# Patient Record
Sex: FEMALE | Race: WHITE | NOT HISPANIC OR LATINO | Employment: UNEMPLOYED | URBAN - METROPOLITAN AREA
[De-identification: names, ages, dates, MRNs, and addresses within clinical notes are randomized per-mention and may not be internally consistent; named-entity substitution may affect disease eponyms.]

---
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
## 2023-01-06 ENCOUNTER — SOCIAL WORK (OUTPATIENT)
Dept: BEHAVIORAL/MENTAL HEALTH CLINIC | Facility: CLINIC | Age: 18
End: 2023-01-06

## 2023-01-06 DIAGNOSIS — F32.0 CURRENT MILD EPISODE OF MAJOR DEPRESSIVE DISORDER, UNSPECIFIED WHETHER RECURRENT (HCC): Primary | ICD-10-CM

## 2023-01-06 DIAGNOSIS — F41.1 GENERALIZED ANXIETY DISORDER: ICD-10-CM
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
## 2023-03-29 ENCOUNTER — TELEPHONE (OUTPATIENT)
Dept: PSYCHIATRY | Facility: CLINIC | Age: 18
End: 2023-03-29

## 2023-03-29 DIAGNOSIS — F32.81 PMDD (PREMENSTRUAL DYSPHORIC DISORDER): ICD-10-CM

## 2023-03-30 RX ORDER — ESCITALOPRAM OXALATE 5 MG/1
5 TABLET ORAL EVERY MORNING
Qty: 90 TABLET | Refills: 0 | Status: SHIPPED | OUTPATIENT
Start: 2023-03-30

## 2023-05-05 ENCOUNTER — OFFICE VISIT (OUTPATIENT)
Dept: FAMILY MEDICINE CLINIC | Facility: CLINIC | Age: 18
End: 2023-05-05

## 2023-05-05 VITALS
DIASTOLIC BLOOD PRESSURE: 78 MMHG | OXYGEN SATURATION: 97 % | WEIGHT: 182 LBS | TEMPERATURE: 98.2 F | HEART RATE: 83 BPM | HEIGHT: 67 IN | BODY MASS INDEX: 28.56 KG/M2 | SYSTOLIC BLOOD PRESSURE: 120 MMHG

## 2023-05-05 DIAGNOSIS — H01.134 ECZEMATOUS DERMATITIS OF UPPER AND LOWER EYELIDS OF BOTH EYES: ICD-10-CM

## 2023-05-05 DIAGNOSIS — E55.9 VITAMIN D INSUFFICIENCY: ICD-10-CM

## 2023-05-05 DIAGNOSIS — Z13.220 LIPID SCREENING: ICD-10-CM

## 2023-05-05 DIAGNOSIS — L21.9 SEBORRHEIC DERMATITIS: ICD-10-CM

## 2023-05-05 DIAGNOSIS — Z13.1 DIABETES MELLITUS SCREENING: ICD-10-CM

## 2023-05-05 DIAGNOSIS — G43.909 MIGRAINE WITHOUT STATUS MIGRAINOSUS, NOT INTRACTABLE, UNSPECIFIED MIGRAINE TYPE: ICD-10-CM

## 2023-05-05 DIAGNOSIS — L71.0 PERIORAL DERMATITIS: ICD-10-CM

## 2023-05-05 DIAGNOSIS — M54.50 ACUTE LOW BACK PAIN WITHOUT SCIATICA, UNSPECIFIED BACK PAIN LATERALITY: ICD-10-CM

## 2023-05-05 DIAGNOSIS — Z11.59 NEED FOR HEPATITIS C SCREENING TEST: ICD-10-CM

## 2023-05-05 DIAGNOSIS — Z00.00 ANNUAL PHYSICAL EXAM: Primary | ICD-10-CM

## 2023-05-05 DIAGNOSIS — Z11.3 SCREENING FOR STD (SEXUALLY TRANSMITTED DISEASE): ICD-10-CM

## 2023-05-05 DIAGNOSIS — H01.131 ECZEMATOUS DERMATITIS OF UPPER AND LOWER EYELIDS OF BOTH EYES: ICD-10-CM

## 2023-05-05 DIAGNOSIS — Z11.4 SCREENING FOR HIV (HUMAN IMMUNODEFICIENCY VIRUS): ICD-10-CM

## 2023-05-05 DIAGNOSIS — F32.81 PMDD (PREMENSTRUAL DYSPHORIC DISORDER): ICD-10-CM

## 2023-05-05 DIAGNOSIS — H01.132 ECZEMATOUS DERMATITIS OF UPPER AND LOWER EYELIDS OF BOTH EYES: ICD-10-CM

## 2023-05-05 DIAGNOSIS — H01.135 ECZEMATOUS DERMATITIS OF UPPER AND LOWER EYELIDS OF BOTH EYES: ICD-10-CM

## 2023-05-05 DIAGNOSIS — E66.3 OVERWEIGHT (BMI 25.0-29.9): ICD-10-CM

## 2023-05-05 RX ORDER — ESCITALOPRAM OXALATE 10 MG/1
10 TABLET ORAL EVERY MORNING
Qty: 90 TABLET | Refills: 0 | Status: SHIPPED | OUTPATIENT
Start: 2023-05-05

## 2023-05-05 RX ORDER — FLUOCINONIDE TOPICAL SOLUTION USP, 0.05% 0.5 MG/ML
SOLUTION TOPICAL 2 TIMES DAILY
Qty: 60 ML | Refills: 0 | Status: SHIPPED | OUTPATIENT
Start: 2023-05-05

## 2023-05-05 RX ORDER — CLINDAMYCIN PHOSPHATE 10 MG/G
GEL TOPICAL 2 TIMES DAILY
Qty: 30 G | Refills: 0 | Status: SHIPPED | OUTPATIENT
Start: 2023-05-05

## 2023-05-05 NOTE — PATIENT INSTRUCTIONS
Wellness Visit for Adults   AMBULATORY CARE:   A wellness visit  is when you see your healthcare provider to get screened for health problems  Your healthcare provider will also give you advice on how to stay healthy  Write down your questions so you remember to ask them  Ask your healthcare provider how often you should have a wellness visit  What happens at a wellness visit:  Your healthcare provider will ask about your health, and your family history of health problems  This includes high blood pressure, heart disease, and cancer  He or she will ask if you have symptoms that concern you, if you smoke, and about your mood  You may also be asked about your intake of medicines, supplements, food, and alcohol  Any of the following may be done: Your weight  will be checked  Your height may also be checked so your body mass index (BMI) can be calculated  Your BMI shows if you are at a healthy weight  Your blood pressure  and heart rate will be checked  Your temperature may also be checked  Blood and urine tests  may be done  Blood tests may be done to check your cholesterol levels  Abnormal cholesterol levels increase your risk for heart disease and stroke  You may also need a blood or urine test to check for diabetes if you are at increased risk  Urine tests may be done to look for signs of an infection or kidney disease  A physical exam  includes checking your heartbeat and lungs with a stethoscope  Your healthcare provider may also check your skin to look for sun damage  Screening tests  may be recommended  A screening test is done to check for diseases that may not cause symptoms  The screening tests you may need depend on your age, gender, family history, and lifestyle habits  For example, colorectal screening may be recommended if you are 48years old or older  Screening tests you need if you are a woman:   A Pap smear  is used to screen for cervical cancer   Pap smears are usually done every 3 to 5 years depending on your age  You may need them more often if you have had abnormal Pap smear test results in the past  Ask your healthcare provider how often you should have a Pap smear  A mammogram  is an x-ray of your breasts to screen for breast cancer  Experts recommend mammograms every 2 years starting at age 48 years  You may need a mammogram at age 52 years or younger if you have an increased risk for breast cancer  Talk to your healthcare provider about when you should start having mammograms and how often you need them  Vaccines you may need:   Get an influenza vaccine  every year  The influenza vaccine protects you from the flu  Several types of viruses cause the flu  The viruses change over time, so new vaccines are made each year  Get a tetanus-diphtheria (Td) booster vaccine  every 10 years  This vaccine protects you against tetanus and diphtheria  Tetanus is a severe infection that may cause painful muscle spasms and lockjaw  Diphtheria is a severe bacterial infection that causes a thick covering in the back of your mouth and throat  Get a human papillomavirus (HPV) vaccine  if you are female and aged 23 to 32 or male 23 to 24 and never received it  This vaccine protects you from HPV infection  HPV is the most common infection spread by sexual contact  HPV may also cause vaginal, penile, and anal cancers  Get a pneumococcal vaccine  if you are aged 72 years or older  The pneumococcal vaccine is an injection given to protect you from pneumococcal disease  Pneumococcal disease is an infection caused by pneumococcal bacteria  The infection may cause pneumonia, meningitis, or an ear infection  Get a shingles vaccine  if you are 60 or older, even if you have had shingles before  The shingles vaccine is an injection to protect you from the varicella-zoster virus  This is the same virus that causes chickenpox   Shingles is a painful rash that develops in people who had chickenpox or have been exposed to the virus  How to eat healthy:  My Plate is a model for planning healthy meals  It shows the types and amounts of foods that should go on your plate  Fruits and vegetables make up about half of your plate, and grains and protein make up the other half  A serving of dairy is included on the side of your plate  The amount of calories and serving sizes you need depends on your age, gender, weight, and height  Examples of healthy foods are listed below:  Eat a variety of vegetables  such as dark green, red, and orange vegetables  You can also include canned vegetables low in sodium (salt) and frozen vegetables without added butter or sauces  Eat a variety of fresh fruits , canned fruit in 100% juice, frozen fruit, and dried fruit  Include whole grains  At least half of the grains you eat should be whole grains  Examples include whole-wheat bread, wheat pasta, brown rice, and whole-grain cereals such as oatmeal     Eat a variety of protein foods such as seafood (fish and shellfish), lean meat, and poultry without skin (turkey and chicken)  Examples of lean meats include pork leg, shoulder, or tenderloin, and beef round, sirloin, tenderloin, and extra lean ground beef  Other protein foods include eggs and egg substitutes, beans, peas, soy products, nuts, and seeds  Choose low-fat dairy products such as skim or 1% milk or low-fat yogurt, cheese, and cottage cheese  Limit unhealthy fats  such as butter, hard margarine, and shortening  Exercise:  Exercise at least 30 minutes per day on most days of the week  Some examples of exercise include walking, biking, dancing, and swimming  You can also fit in more physical activity by taking the stairs instead of the elevator or parking farther away from stores  Include muscle strengthening activities 2 days each week  Regular exercise provides many health benefits   It helps you manage your weight, and decreases your risk for type 2 diabetes, heart disease, stroke, and high blood pressure  Exercise can also help improve your mood  Ask your healthcare provider about the best exercise plan for you  General health and safety guidelines:   Do not smoke  Nicotine and other chemicals in cigarettes and cigars can cause lung damage  Ask your healthcare provider for information if you currently smoke and need help to quit  E-cigarettes or smokeless tobacco still contain nicotine  Talk to your healthcare provider before you use these products  Limit alcohol  A drink of alcohol is 12 ounces of beer, 5 ounces of wine, or 1½ ounces of liquor  Lose weight, if needed  Being overweight increases your risk of certain health conditions  These include heart disease, high blood pressure, type 2 diabetes, and certain types of cancer  Protect your skin  Do not sunbathe or use tanning beds  Use sunscreen with a SPF 15 or higher  Apply sunscreen at least 15 minutes before you go outside  Reapply sunscreen every 2 hours  Wear protective clothing, hats, and sunglasses when you are outside  Drive safely  Always wear your seatbelt  Make sure everyone in your car wears a seatbelt  A seatbelt can save your life if you are in an accident  Do not use your cell phone when you are driving  This could distract you and cause an accident  Pull over if you need to make a call or send a text message  Practice safe sex  Use latex condoms if are sexually active and have more than one partner  Your healthcare provider may recommend screening tests for sexually transmitted infections (STIs)  Wear helmets, lifejackets, and protective gear  Always wear a helmet when you ride a bike or motorcycle, go skiing, or play sports that could cause a head injury  Wear protective equipment when you play sports  Wear a lifejacket when you are on a boat or doing water sports      © Copyright [employer] 2022 Information is for End User's use only and may not be sold, redistributed or otherwise used for commercial purposes  The above information is an  only  It is not intended as medical advice for individual conditions or treatments  Talk to your doctor, nurse or pharmacist before following any medical regimen to see if it is safe and effective for you  Lower Back Exercises   WHAT YOU NEED TO KNOW:   Lower back exercises help heal and strengthen your back muscles to prevent another injury  Ask your healthcare provider if you need to see a physical therapist for more advanced exercises  DISCHARGE INSTRUCTIONS:   Return to the emergency department if:   You have severe pain that prevents you from moving  Call your doctor if:   Your pain becomes worse  You have new pain  You have questions or concerns about your condition or care  Do lower back exercises safely:   Do the exercises on a mat or firm surface (not on a bed)  A firm surface will support your spine and prevent low back pain  Move slowly and smoothly  Avoid fast or jerky motions  Breathe normally  Do not hold your breath  Stop if you feel pain  It is normal to feel some discomfort at first, but you should not feel pain  Regular exercise will help decrease your discomfort over time  Lower back exercises: Your healthcare provider may recommend that you do back exercises 10 to 30 minutes each day  He or she may also recommend that you do exercises 1 to 3 times each day  Ask your provider which exercises are best for you and how often to do them  Ankle pumps:  Lie on your back  Move your foot up (with your toes pointing toward your head)  Then, move your foot down (with your toes pointing away from you)  Repeat this exercise 10 times on each side  Heel slides:  Lie on your back  Slowly bend one leg and then straighten it  Next, bend the other leg and then straighten it  Repeat 10 times on each side           Pelvic tilt:  Lie on your back with your knees bent and feet flat on the floor  Place your arms in a relaxed position beside your body  Tighten the muscles of your abdomen and flatten your back against the floor  Hold for 5 seconds  Repeat 5 times  Back stretch:  Lie on your back with your hands behind your head  Bend your knees and turn the lower half of your body to one side  Hold this position for 10 seconds  Repeat 3 times on each side  Straight leg raises:  Lie on your back with one leg straight  Bend the other knee  Tighten your abdomen and then slowly lift the straight leg up about 6 to 12 inches off the floor  Hold for 1 to 5 seconds  Lower your leg slowly  Repeat 10 times on each leg  Knee-to-chest:  Lie on your back with your knees bent and feet flat on the floor  Pull one of your knees toward your chest and hold it there for 5 seconds  Return your leg to the starting position  Lift the other knee toward your chest and hold for 5 seconds  Do this 5 times on each side  Cat and camel:  Place your hands and knees on the floor  Arch your back upward toward the ceiling and lower your head  Round out your spine as much as you can  Hold for 5 seconds  Lift your head upward and push your chest downward toward the floor  Hold for 5 seconds  Do 3 sets or as directed  Wall squats:  Stand with your back against a wall  Tighten the muscles of your abdomen  Slowly lower your body until your knees are bent at a 45 degree angle  Hold this position for 5 seconds  Slowly move back up to a standing position  Repeat 10 times  Curl up:  Lie on your back with your knees bent and feet flat on the floor  Place your hands, palms down, underneath the curve in your lower back  Next, with your elbows on the floor, lift your shoulders and chest 2 to 3 inches  Keep your head in line with your shoulders  Hold this position for 5 seconds  When you can do this exercise without pain for 10 to 15 seconds, you may add a rotation   While your shoulders and chest are lifted off the ground, turn slightly to the left and hold  Repeat on the other side  Bird dog:  Place your hands and knees on the floor  Keep your wrists directly below your shoulders and your knees directly below your hips  Pull your belly button in toward your spine  Do not flatten or arch your back  Tighten your abdominal muscles  Raise one arm straight out so that it is aligned with your head  Next, raise the leg opposite your arm  Hold this position for 15 seconds  Lower your arm and leg slowly and change sides  Do 5 sets  Follow up with your doctor as directed:  Write down your questions so you remember to ask them during your visits  © Copyright [employer] 2022 Information is for End User's use only and may not be sold, redistributed or otherwise used for commercial purposes  The above information is an  only  It is not intended as medical advice for individual conditions or treatments  Talk to your doctor, nurse or pharmacist before following any medical regimen to see if it is safe and effective for you

## 2023-05-05 NOTE — PROGRESS NOTES
ADULT ANNUAL [facility]    NAME: Ricky Colvin  AGE: 21 y o  SEX: female  : 2005     DATE: 2023     Assessment and Plan:     Problem List Items Addressed This Visit        Digestive    Perioral dermatitis     Given clindamycin gel to try as needed  Call if worsens or persists  Relevant Medications    hydrocortisone 2 5 % cream    fluocinonide (LIDEX) 0 05 % external solution    clindamycin (CLINDAGEL) 1 % gel       Cardiovascular and Mediastinum    Migraine     Overall well controlled  Continue Imitrex, Zofran, and Excedrin as needed  Relevant Medications    escitalopram (LEXAPRO) 10 mg tablet       Musculoskeletal and Integument    Eczematous dermatitis of upper and lower eyelids of both eyes     Use hydrocortisone cream twice daily as needed  Encouraged to use for a short period of time (up to 2 weeks) due to this and skin of eyelids  Call if persists  Relevant Medications    hydrocortisone 2 5 % cream    fluocinonide (LIDEX) 0 05 % external solution    clindamycin (CLINDAGEL) 1 % gel    Seborrheic dermatitis     Given fluocinonide solution to try as needed  Relevant Medications    hydrocortisone 2 5 % cream    fluocinonide (LIDEX) 0 05 % external solution    clindamycin (CLINDAGEL) 1 % gel       Other    PMDD (premenstrual dysphoric disorder)     Patient has been noticing difficulty 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 and increasing her Lexapro on those days  Patient was encouraged to try to keep her dose consistent  We will increase to 10 mg daily and recheck in 6 weeks  Call with concerns in the interim  Relevant Medications    escitalopram (LEXAPRO) 10 mg tablet    Vitamin D insufficiency     Not currently supplementing  Recheck with labs as ordered  Relevant Orders    Vitamin D 25 hydroxy    Overweight (BMI 25 0-29  9)    Relevant Orders CBC and differential    Comprehensive metabolic panel    Lipid Panel with Direct LDL reflex    TSH, 3rd generation with Free T4 reflex   Other Visit Diagnoses     Annual physical exam    -  Primary    Need for hepatitis C screening test        Relevant Orders    Hepatitis C Antibody    Screening for HIV (human immunodeficiency virus)        Relevant Orders    HIV 1/2 AG/AB w Reflex [facility] for 2 yr old and above    Screening for STD (sexually transmitted disease)        Relevant Orders    Chlamydia/GC amplified DNA by PCR    Hepatitis B surface antigen    RPR, Rfx Qn RPR/Confirm TP    Diabetes mellitus screening        Relevant Orders    Comprehensive metabolic panel    Lipid screening        Relevant Orders    Lipid Panel with Direct LDL reflex    Acute low back pain without sciatica, unspecified back pain laterality        Given low back exercises to try  Call if worsens or persists  The USPSTF recommendation for HIV screening in all patients between 13and 72years old (once in lifetime or annually with risk factors) was discussed with the patient  The patient agreed to testing  Immunizations and preventive care screenings were discussed with patient today  Appropriate education was printed on patient's after visit summary  Counseling:  · Exercise: the importance of regular exercise/physical activity was discussed  Recommend exercise 3-5 times per week for at least 30 minutes  BMI Counseling: Body mass index is 28 94 kg/m²  The BMI is above normal  Nutrition recommendations include encouraging healthy choices of fruits and vegetables  Exercise recommendations include exercising 3-5 times per week  Rationale for BMI follow-up plan is due to patient being overweight or obese  Depression Screening and Follow-up Plan: Patient was screened for depression during today's encounter  They screened negative with a PHQ-2 score of 1  Return in about 6 weeks (around 6/16/2023) for Recheck  Chief Complaint:     No chief complaint on file  History of Present Illness:     Adult Annual Physical   Patient here for a comprehensive physical exam  The patient reports problems - as below  Notes that she has been having dry skin around the eyes and mouth - notes that she has tried cooler showers, applying coconut oil to the scalp, tried Aquaphor on the face, Cerave with hyaluronic acid and Cerave PM, plus multiple dandruff products like Head and shoulders, Selsun Blue and Nizoral - notes nothing helps besides the Aquaphor temporarily - this began in the winter and is not improving - has had this before but has previously been just small patches    Notes that her back has been bothering her severely for 2+ weeks - no benefit from getting a new back - no radiation - no leg weakness or incontinence - she lifts for work but denies a known triggering event     PMDD - Lexapro 5 mg daily - notes that she has been increasing the Lexapro to 10 mg on stressful days such as working with [employer]    Migraines-Imitrex 50 mg, Zofran, Excedrin - overall controlled     Vitamin D insufficiency - not on supplement - level was 26 6 in March 2022        Diet and Physical Activity  · Diet/Nutrition: portions are improving - trying to be conscious about not binging - averages 2-3 fruits and veggies  · Exercise: walking  Depression Screening  PHQ-2/9 Depression Screening    Little interest or pleasure in doing things: 1 - several days  Feeling down, depressed, or hopeless: 0 - not at all  PHQ-2 Score: 1  PHQ-2 Interpretation: Negative depression screen       General Health  · Sleep: sleeps well and gets more than 8 hours of sleep on average  · Hearing: normal - bilateral   · Vision: no vision problems  · Dental: regular dental visits  /GYN Health  · Last menstrual period: about 2 weeks ago  · Contraceptive method: IUD placement         Review of Systems:     Review of Systems   Constitutional: Negative for chills and fever  HENT: Negative for congestion, rhinorrhea and sore throat  Eyes: Negative for visual disturbance  Respiratory: Negative for cough, shortness of breath and wheezing  Cardiovascular: Negative for chest pain, palpitations and leg swelling  Gastrointestinal: Negative for abdominal pain, blood in stool, constipation, diarrhea, nausea and vomiting  Endocrine: Negative for polydipsia and polyuria  Genitourinary: Negative for dysuria and frequency  Musculoskeletal: Positive for back pain  Negative for arthralgias and myalgias  Skin: Negative for rash  Dry skin as in HPI   Neurological: Negative for dizziness, syncope and headaches  Hematological: Does not bruise/bleed easily  Psychiatric/Behavioral: Negative for dysphoric mood  The patient is not nervous/anxious  Past Medical History:     Past Medical History:   Diagnosis Date   • Concussion with no loss of consciousness 10/8/2019   • Obstructive sleep apnea, pediatric    • Right ankle sprain 1/8/2018      Past Surgical History:     Past Surgical History:   Procedure Laterality Date   • TONSILLECTOMY AND ADENOIDECTOMY  08/2008    resolved: 8/2008   • WISDOM TOOTH EXTRACTION  08/07/2020      Social History:     Social History     Socioeconomic History   • Marital status: Single     Spouse name: None   • Number of children: None   • Years of education: None   • Highest education level: None   Occupational History   • None   Tobacco Use   • Smoking status: Never   • Smokeless tobacco: Never   Vaping Use   • Vaping Use: Never used   Substance and Sexual Activity   • Alcohol use: No   • Drug use: Never   • Sexual activity: Yes     Partners: Male     Birth control/protection: Condom Male, I U D     Other Topics Concern   • None   Social History Narrative    Caffeine use- one cup daily a few times a week, coffee or cola     Social Determinants of Health     Financial Resource Strain: Not on file   Food Insecurity: Not on file   Transportation Needs: Not on file   Physical Activity: Not on file   Stress: Not on file   Social Connections: Not on file   Intimate Partner Violence: Not on file   Housing Stability: Not on file      Family History:     Family History   Problem Relation Age of Onset   • Otosclerosis Mother    • No Known Problems Father    • Asthma Sister    • Hypertension Maternal Grandmother    • Skin cancer Maternal Grandmother    • Leukemia Maternal Grandmother         CLL   • Alcohol abuse Maternal Grandfather    • Stroke Maternal Grandfather    • Cirrhosis Maternal Grandfather         hepatic   • Anxiety disorder Paternal Grandfather    • Depression Paternal Grandfather    • Suicidality Paternal Grandfather       Current Medications:     Current Outpatient Medications   Medication Sig Dispense Refill   • Acetaminophen-Caffeine 500-65 MG TABS Take 1 tablet by mouth as needed for migraine     • clindamycin (CLINDAGEL) 1 % gel Apply topically 2 (two) times a day Apply to affected areas around the mouth  30 g 0   • escitalopram (LEXAPRO) 10 mg tablet Take 1 tablet (10 mg total) by mouth every morning 90 tablet 0   • fluocinonide (LIDEX) 0 05 % external solution Apply topically 2 (two) times a day Apply to scalp  60 mL 0   • hydrocortisone 2 5 % cream Apply topically 2 (two) times a day To the eyelids as needed  30 g 0   • Multiple Vitamin (MULTI VITAMIN DAILY PO) Take 1 tablet by mouth daily     • ondansetron (ZOFRAN) 4 mg tablet Take 1 tablet (4 mg total) by mouth every 8 (eight) hours as needed for nausea or vomiting 10 tablet 0   • SUMAtriptan (Imitrex) 50 mg tablet Take 1 tablet (50 mg total) by mouth 2 (two) times a day as needed for migraine for up to 9 doses 9 tablet 0     No current facility-administered medications for this visit        Allergies:     No Known Allergies   Physical Exam:     /78 (BP Location: Right arm, Patient Position: Sitting, Cuff Size: Standard)   Pulse 83   Temp 98 2 °F (36 8 °C) "(Tympanic)    5' 6 5\" (1 689 m)   Wt 82 6 kg (182 lb)   LMP 04/21/2023 (Approximate) Comment: IUD  SpO2 97%   BMI 28 94 kg/m²     Physical Exam  Vitals reviewed  Constitutional:       General: She is not in acute distress  Appearance: Normal appearance  She is well-developed  She is not ill-appearing  HENT:      Head: Normocephalic and atraumatic  Right Ear: Tympanic membrane, ear canal and external ear normal       Left Ear: Tympanic membrane, ear canal and external ear normal       Nose: Nose normal  No congestion  Mouth/Throat:      Mouth: Mucous membranes are moist       Pharynx: No oropharyngeal exudate  Eyes:      Conjunctiva/sclera: Conjunctivae normal       Pupils: Pupils are equal, round, and reactive to light  Neck:      Thyroid: No thyromegaly  Vascular: No carotid bruit  Cardiovascular:      Rate and Rhythm: Normal rate and regular rhythm  Pulses: Normal pulses  Heart sounds: Normal heart sounds  No murmur heard  Pulmonary:      Effort: Pulmonary effort is normal       Breath sounds: Normal breath sounds  No wheezing, rhonchi or rales  Abdominal:      General: Bowel sounds are normal  There is no distension  Palpations: Abdomen is soft  There is no mass  Tenderness: There is no abdominal tenderness  Musculoskeletal:      Cervical back: Normal range of motion and neck supple  Right lower leg: No edema  Left lower leg: No edema  Lymphadenopathy:      Cervical: No cervical adenopathy  Skin:     General: Skin is warm and dry  Findings: No rash  Neurological:      Mental Status: She is alert  Sensory: No sensory deficit  Comments: 5/5 strength in UE and LE   Psychiatric:         Mood and Affect: Mood normal          Behavior: Behavior normal          Thought Content:  Thought content normal          Judgment: Judgment normal           Rich Edge PA-C   [facility]  "

## 2023-05-23 PROBLEM — L21.9 SEBORRHEIC DERMATITIS: Status: ACTIVE | Noted: 2023-05-23

## 2023-05-23 PROBLEM — L71.0 PERIORAL DERMATITIS: Status: ACTIVE | Noted: 2023-05-23

## 2023-05-23 PROBLEM — H01.134 ECZEMATOUS DERMATITIS OF UPPER AND LOWER EYELIDS OF BOTH EYES: Status: ACTIVE | Noted: 2023-05-23

## 2023-05-23 PROBLEM — H01.135 ECZEMATOUS DERMATITIS OF UPPER AND LOWER EYELIDS OF BOTH EYES: Status: ACTIVE | Noted: 2023-05-23

## 2023-05-23 PROBLEM — H01.131 ECZEMATOUS DERMATITIS OF UPPER AND LOWER EYELIDS OF BOTH EYES: Status: ACTIVE | Noted: 2023-05-23

## 2023-05-23 PROBLEM — E55.9 VITAMIN D INSUFFICIENCY: Status: ACTIVE | Noted: 2023-05-23

## 2023-05-23 PROBLEM — E66.3 OVERWEIGHT (BMI 25.0-29.9): Status: ACTIVE | Noted: 2023-05-23

## 2023-05-23 PROBLEM — H01.132 ECZEMATOUS DERMATITIS OF UPPER AND LOWER EYELIDS OF BOTH EYES: Status: ACTIVE | Noted: 2023-05-23

## 2023-05-23 NOTE — ASSESSMENT & PLAN NOTE
Use hydrocortisone cream twice daily as needed  Encouraged to use for a short period of time (up to 2 weeks) due to this and skin of eyelids  Call if persists

## 2023-05-23 NOTE — ASSESSMENT & PLAN NOTE
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

## 2023-06-16 ENCOUNTER — APPOINTMENT (OUTPATIENT)
Dept: RADIOLOGY | Facility: MEDICAL CENTER | Age: 18
End: 2023-06-16
Payer: COMMERCIAL

## 2023-06-16 ENCOUNTER — APPOINTMENT (OUTPATIENT)
Dept: LAB | Facility: MEDICAL CENTER | Age: 18
End: 2023-06-16
Payer: COMMERCIAL

## 2023-06-16 ENCOUNTER — OFFICE VISIT (OUTPATIENT)
Dept: FAMILY MEDICINE CLINIC | Facility: CLINIC | Age: 18
End: 2023-06-16
Payer: COMMERCIAL

## 2023-06-16 VITALS
OXYGEN SATURATION: 95 % | WEIGHT: 175.4 LBS | SYSTOLIC BLOOD PRESSURE: 112 MMHG | DIASTOLIC BLOOD PRESSURE: 68 MMHG | BODY MASS INDEX: 27.89 KG/M2 | HEART RATE: 67 BPM

## 2023-06-16 DIAGNOSIS — L71.0 PERIORAL DERMATITIS: ICD-10-CM

## 2023-06-16 DIAGNOSIS — Z11.4 SCREENING FOR HIV (HUMAN IMMUNODEFICIENCY VIRUS): ICD-10-CM

## 2023-06-16 DIAGNOSIS — H01.135 ECZEMATOUS DERMATITIS OF UPPER AND LOWER EYELIDS OF BOTH EYES: ICD-10-CM

## 2023-06-16 DIAGNOSIS — F32.81 PMDD (PREMENSTRUAL DYSPHORIC DISORDER): Primary | ICD-10-CM

## 2023-06-16 DIAGNOSIS — Z11.59 NEED FOR HEPATITIS C SCREENING TEST: ICD-10-CM

## 2023-06-16 DIAGNOSIS — Z13.220 LIPID SCREENING: ICD-10-CM

## 2023-06-16 DIAGNOSIS — Z11.3 SCREENING FOR STD (SEXUALLY TRANSMITTED DISEASE): ICD-10-CM

## 2023-06-16 DIAGNOSIS — M54.50 ACUTE LOW BACK PAIN WITHOUT SCIATICA, UNSPECIFIED BACK PAIN LATERALITY: ICD-10-CM

## 2023-06-16 DIAGNOSIS — Z13.1 DIABETES MELLITUS SCREENING: ICD-10-CM

## 2023-06-16 DIAGNOSIS — E55.9 VITAMIN D INSUFFICIENCY: ICD-10-CM

## 2023-06-16 DIAGNOSIS — L21.9 SEBORRHEIC DERMATITIS: ICD-10-CM

## 2023-06-16 DIAGNOSIS — H01.132 ECZEMATOUS DERMATITIS OF UPPER AND LOWER EYELIDS OF BOTH EYES: ICD-10-CM

## 2023-06-16 DIAGNOSIS — E66.3 OVERWEIGHT (BMI 25.0-29.9): ICD-10-CM

## 2023-06-16 DIAGNOSIS — H01.134 ECZEMATOUS DERMATITIS OF UPPER AND LOWER EYELIDS OF BOTH EYES: ICD-10-CM

## 2023-06-16 DIAGNOSIS — H01.131 ECZEMATOUS DERMATITIS OF UPPER AND LOWER EYELIDS OF BOTH EYES: ICD-10-CM

## 2023-06-16 LAB
25(OH)D3 SERPL-MCNC: 28.3 NG/ML (ref 30–100)
ALBUMIN SERPL BCP-MCNC: 4.1 G/DL (ref 3.5–5)
ALP SERPL-CCNC: 60 U/L (ref 46–384)
ALT SERPL W P-5'-P-CCNC: 19 U/L (ref 12–78)
ANION GAP SERPL CALCULATED.3IONS-SCNC: 3 MMOL/L (ref 4–13)
AST SERPL W P-5'-P-CCNC: 15 U/L (ref 5–45)
BASOPHILS # BLD AUTO: 0.03 THOUSANDS/ÂΜL (ref 0–0.1)
BASOPHILS NFR BLD AUTO: 1 % (ref 0–1)
BILIRUB SERPL-MCNC: 0.69 MG/DL (ref 0.2–1)
BUN SERPL-MCNC: 7 MG/DL (ref 5–25)
CALCIUM SERPL-MCNC: 9.5 MG/DL (ref 8.3–10.1)
CHLORIDE SERPL-SCNC: 109 MMOL/L (ref 96–108)
CHOLEST SERPL-MCNC: 134 MG/DL
CO2 SERPL-SCNC: 27 MMOL/L (ref 21–32)
CREAT SERPL-MCNC: 0.77 MG/DL (ref 0.6–1.3)
EOSINOPHIL # BLD AUTO: 0.12 THOUSAND/ÂΜL (ref 0–0.61)
EOSINOPHIL NFR BLD AUTO: 2 % (ref 0–6)
ERYTHROCYTE [DISTWIDTH] IN BLOOD BY AUTOMATED COUNT: 11.9 % (ref 11.6–15.1)
GFR SERPL CREATININE-BSD FRML MDRD: 112 ML/MIN/1.73SQ M
GLUCOSE P FAST SERPL-MCNC: 92 MG/DL (ref 65–99)
HBV SURFACE AG SER QL: NORMAL
HCT VFR BLD AUTO: 40.2 % (ref 34.8–46.1)
HCV AB SER QL: NORMAL
HDLC SERPL-MCNC: 44 MG/DL
HGB BLD-MCNC: 13.3 G/DL (ref 11.5–15.4)
HIV 1+2 AB+HIV1 P24 AG SERPL QL IA: NORMAL
HIV 2 AB SERPL QL IA: NORMAL
HIV1 AB SERPL QL IA: NORMAL
HIV1 P24 AG SERPL QL IA: NORMAL
IMM GRANULOCYTES # BLD AUTO: 0.02 THOUSAND/UL (ref 0–0.2)
IMM GRANULOCYTES NFR BLD AUTO: 0 % (ref 0–2)
LDLC SERPL CALC-MCNC: 79 MG/DL (ref 0–100)
LYMPHOCYTES # BLD AUTO: 1.68 THOUSANDS/ÂΜL (ref 0.6–4.47)
LYMPHOCYTES NFR BLD AUTO: 27 % (ref 14–44)
MCH RBC QN AUTO: 29.8 PG (ref 26.8–34.3)
MCHC RBC AUTO-ENTMCNC: 33.1 G/DL (ref 31.4–37.4)
MCV RBC AUTO: 90 FL (ref 82–98)
MONOCYTES # BLD AUTO: 0.48 THOUSAND/ÂΜL (ref 0.17–1.22)
MONOCYTES NFR BLD AUTO: 8 % (ref 4–12)
NEUTROPHILS # BLD AUTO: 3.91 THOUSANDS/ÂΜL (ref 1.85–7.62)
NEUTS SEG NFR BLD AUTO: 62 % (ref 43–75)
NRBC BLD AUTO-RTO: 0 /100 WBCS
PLATELET # BLD AUTO: 263 THOUSANDS/UL (ref 149–390)
PMV BLD AUTO: 10.8 FL (ref 8.9–12.7)
POTASSIUM SERPL-SCNC: 4.1 MMOL/L (ref 3.5–5.3)
PROT SERPL-MCNC: 7 G/DL (ref 6.4–8.4)
RBC # BLD AUTO: 4.46 MILLION/UL (ref 3.81–5.12)
SODIUM SERPL-SCNC: 139 MMOL/L (ref 135–147)
TREPONEMA PALLIDUM IGG+IGM AB [PRESENCE] IN SERUM OR PLASMA BY IMMUNOASSAY: NORMAL
TRIGL SERPL-MCNC: 53 MG/DL
TSH SERPL DL<=0.05 MIU/L-ACNC: 3.62 UIU/ML (ref 0.45–4.5)
WBC # BLD AUTO: 6.24 THOUSAND/UL (ref 4.31–10.16)

## 2023-06-16 PROCEDURE — 86803 HEPATITIS C AB TEST: CPT

## 2023-06-16 PROCEDURE — 80053 COMPREHEN METABOLIC PANEL: CPT

## 2023-06-16 PROCEDURE — 87491 CHLMYD TRACH DNA AMP PROBE: CPT

## 2023-06-16 PROCEDURE — 87591 N.GONORRHOEAE DNA AMP PROB: CPT

## 2023-06-16 PROCEDURE — 36415 COLL VENOUS BLD VENIPUNCTURE: CPT

## 2023-06-16 PROCEDURE — 84443 ASSAY THYROID STIM HORMONE: CPT

## 2023-06-16 PROCEDURE — 80061 LIPID PANEL: CPT

## 2023-06-16 PROCEDURE — 87340 HEPATITIS B SURFACE AG IA: CPT

## 2023-06-16 PROCEDURE — 87389 HIV-1 AG W/HIV-1&-2 AB AG IA: CPT

## 2023-06-16 PROCEDURE — 85025 COMPLETE CBC W/AUTO DIFF WBC: CPT

## 2023-06-16 PROCEDURE — 86780 TREPONEMA PALLIDUM: CPT

## 2023-06-16 PROCEDURE — 82306 VITAMIN D 25 HYDROXY: CPT

## 2023-06-16 PROCEDURE — 72110 X-RAY EXAM L-2 SPINE 4/>VWS: CPT

## 2023-06-16 PROCEDURE — 99214 OFFICE O/P EST MOD 30 MIN: CPT | Performed by: PHYSICIAN ASSISTANT

## 2023-06-16 RX ORDER — CICLOPIROX 1 G/100ML
1 SHAMPOO TOPICAL 2 TIMES WEEKLY
Qty: 120 ML | Refills: 0 | Status: SHIPPED | OUTPATIENT
Start: 2023-06-19

## 2023-06-16 NOTE — PROGRESS NOTES
FAMILY PRACTICE OFFICE VISIT  [facility]       NAME: Gini Colvin  AGE: 21 y o  SEX: female       : 2005        MRN: [id number]    DATE: 2023  TIME: 1:44 AM    Assessment and Plan     Problem List Items Addressed This Visit        Digestive    Perioral dermatitis     Improved with clindamycin gel  Relevant Medications    Ciclopirox 1 % shampoo       Musculoskeletal and Integument    Eczematous dermatitis of upper and lower eyelids of both eyes     Improved with hydrocortisone cream          Relevant Medications    Ciclopirox 1 % shampoo    Seborrheic dermatitis     Persistent  We discussed options of trying another topical treatment versus doing an oral antifungal   She is opted to try another topical treatment first   She was given a prescription for ciclopirox shampoo to try twice weekly  She will call if her scalp worsens or fails to improve  She was encouraged to get her previously ordered blood work done as this is due but also in case she needs to proceed with doing an oral antifungal   You would want to have recent liver enzymes prior to initiating oral antifungal treatment  Relevant Medications    Ciclopirox 1 % shampoo       Other    PMDD (premenstrual dysphoric disorder) - Primary     Improved  Continue increased dose of Lexapro 10 mg daily  We will continue to monitor  Acute low back pain without sciatica     Persistent despite trying stretches from the handout provided at her last visit as well as using Tylenol or ibuprofen  Given prescription for x-ray of the lumbar spine as well as referral to physical therapy  Relevant Orders    XR spine lumbar minimum 4 views non injury    Ambulatory Referral to Physical Therapy       Seborrheic dermatitis  Persistent    We discussed options of trying another topical treatment versus doing an oral antifungal   She is opted to try another topical treatment first   She was given a prescription for ciclopirox shampoo to try twice weekly  She will call if her scalp worsens or fails to improve  She was encouraged to get her previously ordered blood work done as this is due but also in case she needs to proceed with doing an oral antifungal   You would want to have recent liver enzymes prior to initiating oral antifungal treatment  Perioral dermatitis  Improved with clindamycin gel  Eczematous dermatitis of upper and lower eyelids of both eyes  Improved with hydrocortisone cream     Acute low back pain without sciatica  Persistent despite trying stretches from the handout provided at her last visit as well as using Tylenol or ibuprofen  Given prescription for x-ray of the lumbar spine as well as referral to physical therapy  PMDD (premenstrual dysphoric disorder)  Improved  Continue increased dose of Lexapro 10 mg daily  We will continue to monitor  Chief Complaint     Chief Complaint   Patient presents with   • Follow-up     6w       History of Present Illness   Milvia Douglass is a 21y o -year-old female who presents for follow-up on dermatitis and PMDD       Given clindamycin gel to try her perioral dermatitis and hydrocortisone for eczematous dermatitis on eyelids plus fluocinonide solution for seborrheic dermatitis -these skin conditions have somewhat better - notes that the eyes and mouth are better but scalp is still bothersome     PMDD - increased Lexapro to 10 mg last visit - she notes that she is feeling much better and denies any side effects - notes that she is tired upon waking for 12 hours      Labs not done since last visit    Notes that her back continues to bother her - takes Tylenol or ibuprofen occasional if needed for work - not able to touch her toes - not benefiting much from the stretches on the handout given last visit - no incontinence and no leg symptoms         Review of Systems   Review of Systems Musculoskeletal: Positive for back pain  Skin: Positive for rash  Psychiatric/Behavioral: Negative for dysphoric mood  Active Problem List     Patient Active Problem List   Diagnosis   • Seasonal allergies   • Migraine   • PMDD (premenstrual dysphoric disorder)   • Birth control counseling   • IUD (intrauterine device) in place   • Expulsion of intrauterine contraceptive device ([address] )   • Eczematous dermatitis of upper and lower eyelids of both eyes   • Vitamin D insufficiency   • Overweight (BMI 25 0-29  9)   • Perioral dermatitis   • Seborrheic dermatitis   • Acute low back pain without sciatica         Past Medical History:  Past Medical History:   Diagnosis Date   • Concussion with no loss of consciousness 10/8/2019   • Obstructive sleep apnea, pediatric    • Right ankle sprain 1/8/2018       Past Surgical History:  Past Surgical History:   Procedure Laterality Date   • TONSILLECTOMY AND ADENOIDECTOMY  08/2008    resolved: 8/2008   • WISDOM TOOTH EXTRACTION  08/07/2020       Family History:  Family History   Problem Relation Age of Onset   • Otosclerosis Mother    • No Known Problems Father    • Asthma Sister    • Hypertension Maternal Grandmother    • Skin cancer Maternal Grandmother    • Leukemia Maternal Grandmother         CLL   • Alcohol abuse Maternal Grandfather    • Stroke Maternal Grandfather    • Cirrhosis Maternal Grandfather         hepatic   • Anxiety disorder Paternal Grandfather    • Depression Paternal Grandfather    • Suicidality Paternal Grandfather        Social History:  Social History     Socioeconomic History   • Marital status: Single     Spouse name: Not on file   • Number of children: Not on file   • Years of education: Not on file   • Highest education level: Not on file   Occupational History   • Not on file   Tobacco Use   • Smoking status: Never   • Smokeless tobacco: Never   Vaping Use   • Vaping Use: Never used   Substance and Sexual Activity   • Alcohol use: No   • Drug use: Never   • Sexual activity: Yes     Partners: Male     Birth control/protection: Condom Male, I U D  Other Topics Concern   • Not on file   Social History Narrative    Caffeine use- one cup daily a few times a week, coffee or cola     Social Determinants of Health     Financial Resource Strain: Not on file   Food Insecurity: Not on file   Transportation Needs: Not on file   Physical Activity: Not on file   Stress: Not on file   Social Connections: Not on file   Intimate Partner Violence: Not on file   Housing Stability: Not on file       Objective     Vitals:    06/16/23 1141   BP: 112/68   BP Location: Left arm   Patient Position: Sitting   Cuff Size: Standard   Pulse: 67   SpO2: 95%   Weight: 79 6 kg (175 lb 6 4 oz)     Wt Readings from Last 3 Encounters:   06/16/23 79 6 kg (175 lb 6 4 oz) (93 %, Z= 1 51)*   05/05/23 82 6 kg (182 lb) (95 %, Z= 1 65)*   10/28/22 90 4 kg (199 lb 6 4 oz) (97 %, Z= 1 95)*     * Growth percentiles are based on CDC (Girls, 2-20 Years) data  Physical Exam  Vitals reviewed  Constitutional:       General: She is not in acute distress  Appearance: Normal appearance  She is not ill-appearing  Cardiovascular:      Rate and Rhythm: Normal rate and regular rhythm  Pulses: Normal pulses  Heart sounds: Normal heart sounds  No murmur heard  Pulmonary:      Effort: Pulmonary effort is normal       Breath sounds: Normal breath sounds  No wheezing, rhonchi or rales  Musculoskeletal:         General: Tenderness (Over the lower lumbar spine but not paraspinal areas) present  Skin:     General: Skin is warm and dry  Comments: Diffuse flaking of her scalp   Neurological:      Mental Status: She is alert  Sensory: No sensory deficit (Equal and intact light touch sensation in the lower extremities bilaterally)  Motor: No weakness (5/5 strength in the lower extremities bilaterally)     Psychiatric:         Mood and Affect: Mood normal          Behavior: Behavior normal          Thought Content: Thought content normal          Judgment: Judgment normal          Pertinent Laboratory/Diagnostic Studies:  Lab Results   Component Value Date    BUN 7 06/16/2023    CREATININE 0 77 06/16/2023    CALCIUM 9 5 06/16/2023    K 4 1 06/16/2023    CO2 27 06/16/2023     (H) 06/16/2023     Lab Results   Component Value Date    ALT 19 06/16/2023    AST 15 06/16/2023    ALKPHOS 60 06/16/2023       Lab Results   Component Value Date    WBC 6 24 06/16/2023    HGB 13 3 06/16/2023    HCT 40 2 06/16/2023    MCV 90 06/16/2023     06/16/2023     Lab Results   Component Value Date    TRIG 53 06/16/2023     Lab Results   Component Value Date    HDL 44 (L) 06/16/2023     Lab Results   Component Value Date    LDLCALC 79 06/16/2023     Results for orders placed or performed in visit on 06/30/22   POCT urine HCG   Result Value Ref Range    URINE HCG Negative        Orders Placed This Encounter   Procedures   • XR spine lumbar minimum 4 views non injury   • Ambulatory Referral to Physical Therapy       ALLERGIES:  No Known Allergies    Current Medications     Current Outpatient Medications   Medication Sig Dispense Refill   • Acetaminophen-Caffeine 500-65 MG TABS Take 1 tablet by mouth as needed for migraine     • Ciclopirox 1 % shampoo Apply 1 Application topically 2 (two) times a week 120 mL 0   • clindamycin (CLINDAGEL) 1 % gel Apply topically 2 (two) times a day Apply to affected areas around the mouth  30 g 0   • escitalopram (LEXAPRO) 10 mg tablet Take 1 tablet (10 mg total) by mouth every morning 90 tablet 0   • fluocinonide (LIDEX) 0 05 % external solution Apply topically 2 (two) times a day Apply to scalp  60 mL 0   • hydrocortisone 2 5 % cream Apply topically 2 (two) times a day To the eyelids as needed   30 g 0   • Multiple Vitamin (MULTI VITAMIN DAILY PO) Take 1 tablet by mouth daily     • ondansetron (ZOFRAN) 4 mg tablet Take 1 tablet (4 mg total) by mouth every 8 (eight) hours as needed for nausea or vomiting 10 tablet 0   • SUMAtriptan (Imitrex) 50 mg tablet Take 1 tablet (50 mg total) by mouth 2 (two) times a day as needed for migraine for up to 9 doses 9 tablet 0     No current facility-administered medications for this visit           Health Maintenance     Health Maintenance   Topic Date Due   • Influenza Vaccine (Season Ended) 09/01/2023   • COVID-19 Vaccine (3 - Pfizer series) 08/05/2023 (Originally 7/1/2021)   • Chlamydia Screening  10/28/2023 (Originally 11/27/2019)   • Annual Physical  05/05/2024   • BMI: Followup Plan  05/23/2024   • Depression Screening  06/16/2024   • BMI: Adult  06/16/2024   • DTaP,Tdap,and Td Vaccines (7 - Td or Tdap) 10/13/2025   • HIV Screening  Completed   • Hepatitis C Screening  Completed   • Pneumococcal Vaccine: Pediatrics (0 to 5 Years) and At-Risk Patients (6 to 59 Years)  Completed   • HIB Vaccine  Completed   • IPV Vaccine  Completed   • Hepatitis A Vaccine  Completed   • Meningococcal ACWY Vaccine  Completed   • HPV Vaccine  Completed     Immunization History   Administered Date(s) Administered   • COVID-19 PFIZER VACCINE 0 3 ML IM 04/15/2021, 05/06/2021   • DTaP 5 02/13/2004, 03/26/2004, 05/25/2004, 03/21/2005, 04/17/2009   • HPV9 01/08/2018, 07/09/2018, 10/15/2019   • Hep A, ped/adol, 2 dose 03/25/2011, 09/28/2011   • Hep B, adult 2005, 01/06/2004, 05/25/2004   • Hib (PRP-OMP) 02/13/2004, 03/26/2004, 05/25/2004, 03/21/2005   • IPV 02/13/2004, 03/26/2004, 03/21/2005, 04/17/2009   • Influenza Quadrivalent Preservative Free 3 years and older IM 01/08/2018   • Influenza, injectable, quadrivalent, preservative free 0 5 mL 11/29/2018, 10/15/2019, 11/17/2020, 12/28/2021   • Influenza, seasonal, injectable 12/10/2004, 01/10/2005, 12/16/2005, 11/20/2006, 09/13/2007, 10/27/2008, 01/23/2009, 03/16/2011, 10/10/2012, 10/19/2013, 10/13/2015   • MMR 12/10/2004, 04/17/2009   • Meningococcal B, Recombinant (Reina Blade) 01/16/2020, 07/20/2020   • Meningococcal MCV4P 01/16/2020   • Meningococcal, Unknown Serogroups 10/13/2015   • Pneumococcal Conjugate 13-Valent 02/13/2004, 03/26/2004, 08/23/2004, 03/21/2005   • Tdap 10/13/2015   • Varicella 12/10/2004, 04/17/2009       Nohemy Higuera PA-C  6/19/2023 1:44 AM  [facility]

## 2023-06-19 PROBLEM — M54.50 ACUTE LOW BACK PAIN WITHOUT SCIATICA: Status: ACTIVE | Noted: 2023-06-19

## 2023-06-19 LAB
C TRACH DNA SPEC QL NAA+PROBE: NEGATIVE
N GONORRHOEA DNA SPEC QL NAA+PROBE: NEGATIVE

## 2023-06-19 NOTE — ASSESSMENT & PLAN NOTE
Persistent  We discussed options of trying another topical treatment versus doing an oral antifungal   She is opted to try another topical treatment first   She was given a prescription for ciclopirox shampoo to try twice weekly  She will call if her scalp worsens or fails to improve  She was encouraged to get her previously ordered blood work done as this is due but also in case she needs to proceed with doing an oral antifungal   You would want to have recent liver enzymes prior to initiating oral antifungal treatment

## 2023-06-19 NOTE — ASSESSMENT & PLAN NOTE
Persistent despite trying stretches from the handout provided at her last visit as well as using Tylenol or ibuprofen  Given prescription for x-ray of the lumbar spine as well as referral to physical therapy

## 2023-07-27 DIAGNOSIS — F32.81 PMDD (PREMENSTRUAL DYSPHORIC DISORDER): ICD-10-CM

## 2023-07-27 RX ORDER — ESCITALOPRAM OXALATE 10 MG/1
10 TABLET ORAL EVERY MORNING
Qty: 90 TABLET | Refills: 0 | Status: SHIPPED | OUTPATIENT
Start: 2023-07-27

## 2023-08-16 DIAGNOSIS — L21.9 SEBORRHEIC DERMATITIS: Primary | ICD-10-CM

## 2023-08-16 RX ORDER — ITRACONAZOLE 100 MG/1
200 CAPSULE ORAL DAILY
Qty: 28 CAPSULE | Refills: 0 | Status: SHIPPED | OUTPATIENT
Start: 2023-08-16 | End: 2023-08-30

## 2023-11-13 ENCOUNTER — OFFICE VISIT (OUTPATIENT)
Dept: FAMILY MEDICINE CLINIC | Facility: CLINIC | Age: 18
End: 2023-11-13
Payer: COMMERCIAL

## 2023-11-13 VITALS
OXYGEN SATURATION: 98 % | SYSTOLIC BLOOD PRESSURE: 102 MMHG | DIASTOLIC BLOOD PRESSURE: 70 MMHG | BODY MASS INDEX: 27.47 KG/M2 | HEART RATE: 85 BPM | WEIGHT: 172.8 LBS

## 2023-11-13 DIAGNOSIS — H01.132 ECZEMATOUS DERMATITIS OF UPPER AND LOWER EYELIDS OF BOTH EYES: Primary | ICD-10-CM

## 2023-11-13 DIAGNOSIS — L21.9 SEBORRHEIC DERMATITIS: ICD-10-CM

## 2023-11-13 DIAGNOSIS — H01.135 ECZEMATOUS DERMATITIS OF UPPER AND LOWER EYELIDS OF BOTH EYES: Primary | ICD-10-CM

## 2023-11-13 DIAGNOSIS — H01.131 ECZEMATOUS DERMATITIS OF UPPER AND LOWER EYELIDS OF BOTH EYES: Primary | ICD-10-CM

## 2023-11-13 DIAGNOSIS — H01.134 ECZEMATOUS DERMATITIS OF UPPER AND LOWER EYELIDS OF BOTH EYES: Primary | ICD-10-CM

## 2023-11-13 PROCEDURE — 99214 OFFICE O/P EST MOD 30 MIN: CPT | Performed by: PHYSICIAN ASSISTANT

## 2023-11-13 RX ORDER — ITRACONAZOLE 100 MG/1
200 CAPSULE ORAL 2 TIMES DAILY
Qty: 56 CAPSULE | Refills: 0 | Status: SHIPPED | OUTPATIENT
Start: 2023-11-13 | End: 2023-11-27

## 2023-11-13 RX ORDER — FLUOCINONIDE TOPICAL SOLUTION USP, 0.05% 0.5 MG/ML
SOLUTION TOPICAL 3 TIMES DAILY
Qty: 60 ML | Refills: 1 | Status: SHIPPED | OUTPATIENT
Start: 2023-11-13

## 2023-11-13 NOTE — PROGRESS NOTES
FAMILY PRACTICE ACUTE OFFICE VISIT  [facility] - On license of [facility]       NAME: Mohit Colvin  AGE: 21 y.o. SEX: female       : 2005        MRN: [id number]    DATE: 2023  TIME: 1:51 AM    Assessment and Plan     Problem List Items Addressed This Visit          Musculoskeletal and Integument    Eczematous dermatitis of upper and lower eyelids of both eyes - Primary     Continue hydrocortisone cream as needed. Referred to dermatology. Relevant Medications    fluocinonide (LIDEX) 0.05 % external solution    Other Relevant Orders    Ambulatory Referral to Dermatology    Seborrheic dermatitis     Tried fluocinonide solution as well as 2-week course of itraconazole. Referred to dermatology as well. Call if worsens or persists. Relevant Medications    fluocinonide (LIDEX) 0.05 % external solution    itraconazole (SPORANOX) 100 mg capsule    Other Relevant Orders    Ambulatory Referral to Dermatology       Eczematous dermatitis of upper and lower eyelids of both eyes  Continue hydrocortisone cream as needed. Referred to dermatology. Seborrheic dermatitis  Tried fluocinonide solution as well as 2-week course of itraconazole. Referred to dermatology as well. Call if worsens or persists. Chief Complaint     Chief Complaint   Patient presents with    Follow-up     Dry skin       History of Present Illness   Tomer Romero is a 21y.o.-year-old female who presents for ongoing skin concerns. Patient notes that she is having trouble with her eyelids again - notes that it improved with hydrocortisone cream but then came back several days after stopping it     Scalp seems worse - never improved with topicals or oral antifungal - now over most of scalp rather than just the back lower hairline area     Rash  This is a recurrent problem. The current episode started more than 1 year ago. The problem has been waxing and waning since onset.  The affected locations include the scalp and face. The rash is characterized by dryness and itchiness. She was exposed to nothing. Pertinent negatives include no anorexia, congestion, cough, diarrhea, facial edema, fatigue, fever, joint pain, rhinorrhea, shortness of breath, sore throat or vomiting. Review of Systems   Review of Systems   Constitutional:  Negative for fatigue and fever. HENT:  Negative for congestion, rhinorrhea and sore throat. Eyes:  Negative for pain. Respiratory:  Negative for cough and shortness of breath. Gastrointestinal:  Negative for anorexia, diarrhea and vomiting. Musculoskeletal:  Negative for joint pain. Skin:  Positive for rash. Active Problem List     Patient Active Problem List   Diagnosis    Seasonal allergies    Migraine    PMDD (premenstrual dysphoric disorder)    Birth control counseling    IUD (intrauterine device) in place    Expulsion of intrauterine contraceptive device ([address])    Eczematous dermatitis of upper and lower eyelids of both eyes    Vitamin D insufficiency    Overweight (BMI 25.0-29. 9)    Perioral dermatitis    Seborrheic dermatitis    Acute low back pain without sciatica         Social History:  Social History     Socioeconomic History    Marital status: Single     Spouse name: Not on file    Number of children: Not on file    Years of education: Not on file    Highest education level: Not on file   Occupational History    Not on file   Tobacco Use    Smoking status: Never    Smokeless tobacco: Never   Vaping Use    Vaping Use: Never used   Substance and Sexual Activity    Alcohol use: No    Drug use: Never    Sexual activity: Yes     Partners: Male     Birth control/protection: Condom Male, I.U.D.    Other Topics Concern    Not on file   Social History Narrative    Caffeine use- one cup daily a few times a week, coffee or cola     Social Determinants of Health     Financial Resource Strain: Not on file   Food Insecurity: Not on file Transportation Needs: Not on file   Physical Activity: Not on file   Stress: Not on file   Social Connections: Not on file   Intimate Partner Violence: Not on file   Housing Stability: Not on file       Objective     Vitals:    11/13/23 0839   BP: 102/70   BP Location: Left arm   Patient Position: Sitting   Cuff Size: Large   Pulse: 85   SpO2: 98%   Weight: 78.4 kg (172 lb 12.8 oz)     Wt Readings from Last 3 Encounters:   11/13/23 78.4 kg (172 lb 12.8 oz) (92 %, Z= 1.44)*   06/16/23 79.6 kg (175 lb 6.4 oz) (93 %, Z= 1.51)*   05/05/23 82.6 kg (182 lb) (95 %, Z= 1.65)*     * Growth percentiles are based on CDC (Girls, 2-20 Years) data. Physical Exam  Constitutional:       General: She is not in acute distress. Appearance: Normal appearance. She is not ill-appearing. HENT:      Head: Normocephalic and atraumatic. Cardiovascular:      Rate and Rhythm: Normal rate and regular rhythm. Pulses: Normal pulses. Heart sounds: Normal heart sounds. No murmur heard. Pulmonary:      Effort: Pulmonary effort is normal. No respiratory distress. Breath sounds: Normal breath sounds. No wheezing, rhonchi or rales. Skin:     General: Skin is warm and dry. Findings: Erythema (Erythematous dry skin around both eyes as noted in image below) present. Comments: Very dry, flaking scalp   Neurological:      Mental Status: She is alert. Psychiatric:         Mood and Affect: Mood normal.         Behavior: Behavior normal.         Thought Content:  Thought content normal.         Judgment: Judgment normal.                 Pertinent Laboratory/Diagnostic Studies:  Results for orders placed or performed in visit on 06/16/23   Chlamydia/GC amplified DNA by PCR    Specimen: Urine, Other   Result Value Ref Range    N gonorrhoeae, DNA Probe Negative Negative    Chlamydia trachomatis, DNA Probe Negative Negative   Hepatitis C Antibody   Result Value Ref Range    Hepatitis C Ab Non-reactive Non-Reactive   HIV 1/2 AG/AB w Reflex [facility] for 2 yr old and above   Result Value Ref Range    HIV-1 p24 Antigen Non-Reactive Non-Reactive    HIV-1 Antibody Non-Reactive Non-Reactive    HIV-2 Antibody Non-Reactive Non-Reactive    HIV Ag-Ab 5th Gen Non-Reactive Non-Reactive   CBC and differential   Result Value Ref Range    WBC 6.24 4.31 - 10.16 Thousand/uL    RBC 4.46 3.81 - 5.12 Million/uL    Hemoglobin 13.3 11.5 - 15.4 g/dL    Hematocrit 40.2 34.8 - 46.1 %    MCV 90 82 - 98 fL    MCH 29.8 26.8 - 34.3 pg    MCHC 33.1 31.4 - 37.4 g/dL    RDW 11.9 11.6 - 15.1 %    MPV 10.8 8.9 - 12.7 fL    Platelets 704 645 - 598 Thousands/uL    nRBC 0 /100 WBCs    Neutrophils Relative 62 43 - 75 %    Immat GRANS % 0 0 - 2 %    Lymphocytes Relative 27 14 - 44 %    Monocytes Relative 8 4 - 12 %    Eosinophils Relative 2 0 - 6 %    Basophils Relative 1 0 - 1 %    Neutrophils Absolute 3.91 1.85 - 7.62 Thousands/µL    Immature Grans Absolute 0.02 0.00 - 0.20 Thousand/uL    Lymphocytes Absolute 1.68 0.60 - 4.47 Thousands/µL    Monocytes Absolute 0.48 0.17 - 1.22 Thousand/µL    Eosinophils Absolute 0.12 0.00 - 0.61 Thousand/µL    Basophils Absolute 0.03 0.00 - 0.10 Thousands/µL   Comprehensive metabolic panel   Result Value Ref Range    Sodium 139 135 - 147 mmol/L    Potassium 4.1 3.5 - 5.3 mmol/L    Chloride 109 (H) 96 - 108 mmol/L    CO2 27 21 - 32 mmol/L    ANION GAP 3 (L) 4 - 13 mmol/L    BUN 7 5 - 25 mg/dL    Creatinine 0.77 0.60 - 1.30 mg/dL    Glucose, Fasting 92 65 - 99 mg/dL    Calcium 9.5 8.3 - 10.1 mg/dL    AST 15 5 - 45 U/L    ALT 19 12 - 78 U/L    Alkaline Phosphatase 60 46 - 384 U/L    Total Protein 7.0 6.4 - 8.4 g/dL    Albumin 4.1 3.5 - 5.0 g/dL    Total Bilirubin 0.69 0.20 - 1.00 mg/dL    eGFR 112 ml/min/1.73sq m   Lipid Panel with Direct LDL reflex   Result Value Ref Range    Cholesterol 134 See Comment mg/dL    Triglycerides 53 See Comment mg/dL    HDL, Direct 44 (L) >=50 mg/dL    LDL Calculated 79 0 - 100 mg/dL   TSH, 3rd generation with Free T4 reflex   Result Value Ref Range    TSH 3RD GENERATON 3.623 0.450 - 4.500 uIU/mL   Hepatitis B surface antigen   Result Value Ref Range    Hepatitis B Surface Ag Non-reactive Non-Reactive   Vitamin D 25 hydroxy   Result Value Ref Range    Vit D, 25-Hydroxy 28.3 (L) 30.0 - 100.0 ng/mL   RPR-Syphilis Screening (Total Syphilis IGG/IGM)   Result Value Ref Range    Syphilis Total Antibody Non-reactive Non-Reactive       Orders Placed This Encounter   Procedures    Ambulatory Referral to Dermatology       ALLERGIES:  No Known Allergies    Current Medications     Current Outpatient Medications   Medication Sig Dispense Refill    Acetaminophen-Caffeine 500-65 MG TABS Take 1 tablet by mouth as needed for migraine      Ciclopirox 1 % shampoo Apply 1 Application topically 2 (two) times a week 120 mL 0    clindamycin (CLINDAGEL) 1 % gel Apply topically 2 (two) times a day Apply to affected areas around the mouth. 30 g 0    escitalopram (LEXAPRO) 10 mg tablet Take 1 tablet (10 mg total) by mouth every morning 90 tablet 0    fluocinonide (LIDEX) 0.05 % external solution Apply topically 3 (three) times a day Apply to scalp. 60 mL 1    hydrocortisone 2.5 % cream Apply topically 2 (two) times a day To the eyelids as needed. 30 g 0    itraconazole (SPORANOX) 100 mg capsule Take 2 capsules (200 mg total) by mouth 2 (two) times a day for 14 days 56 capsule 0    Multiple Vitamin (MULTI VITAMIN DAILY PO) Take 1 tablet by mouth daily      ondansetron (ZOFRAN) 4 mg tablet Take 1 tablet (4 mg total) by mouth every 8 (eight) hours as needed for nausea or vomiting 10 tablet 0    SUMAtriptan (Imitrex) 50 mg tablet Take 1 tablet (50 mg total) by mouth 2 (two) times a day as needed for migraine for up to 9 doses 9 tablet 0     No current facility-administered medications for this visit.          Kayden Shah PA-C  11/20/2023 1:51 AM  [facility]      Answers submitted by the patient for this visit:  Rash Questionnaire (Submitted on 11/13/2023)  Chief Complaint: Rash  nail changes:  No

## 2023-11-20 NOTE — ASSESSMENT & PLAN NOTE
Tried fluocinonide solution as well as 2-week course of itraconazole. Referred to dermatology as well. Call if worsens or persists.

## 2023-12-19 DIAGNOSIS — G43.909 ACUTE MIGRAINE: ICD-10-CM

## 2023-12-19 DIAGNOSIS — F32.81 PMDD (PREMENSTRUAL DYSPHORIC DISORDER): ICD-10-CM

## 2023-12-20 RX ORDER — ONDANSETRON 4 MG/1
4 TABLET, FILM COATED ORAL EVERY 8 HOURS PRN
Qty: 10 TABLET | Refills: 0 | Status: SHIPPED | OUTPATIENT
Start: 2023-12-20

## 2023-12-20 RX ORDER — ESCITALOPRAM OXALATE 10 MG/1
10 TABLET ORAL EVERY MORNING
Qty: 90 TABLET | Refills: 0 | Status: SHIPPED | OUTPATIENT
Start: 2023-12-20

## 2024-02-19 ENCOUNTER — OFFICE VISIT (OUTPATIENT)
Dept: URGENT CARE | Age: 19
End: 2024-02-19
Payer: COMMERCIAL

## 2024-02-19 VITALS
RESPIRATION RATE: 18 BRPM | SYSTOLIC BLOOD PRESSURE: 120 MMHG | HEART RATE: 96 BPM | OXYGEN SATURATION: 97 % | DIASTOLIC BLOOD PRESSURE: 80 MMHG | TEMPERATURE: 98.1 F

## 2024-02-19 DIAGNOSIS — J01.00 ACUTE MAXILLARY SINUSITIS, RECURRENCE NOT SPECIFIED: Primary | ICD-10-CM

## 2024-02-19 PROCEDURE — 99213 OFFICE O/P EST LOW 20 MIN: CPT | Performed by: STUDENT IN AN ORGANIZED HEALTH CARE EDUCATION/TRAINING PROGRAM

## 2024-02-19 RX ORDER — AMOXICILLIN AND CLAVULANATE POTASSIUM 875; 125 MG/1; MG/1
1 TABLET, FILM COATED ORAL EVERY 12 HOURS SCHEDULED
Qty: 14 TABLET | Refills: 0 | Status: SHIPPED | OUTPATIENT
Start: 2024-02-19 | End: 2024-02-26

## 2024-02-19 NOTE — PATIENT INSTRUCTIONS
Please take antibiotic as prescribed.  Continue use your nasal saline rinse once to twice per day.  Use Flonase after this before bed to help reduce the pressure in your ears.  Stay well-hydrated.  I recommend taking a probiotic or yogurt to help reduce issues from the antibiotics such as diarrhea or yeast infection.    If your symptoms are not improving with the above, please follow-up with your PCP.

## 2024-03-03 DIAGNOSIS — B37.9 YEAST INFECTION: Primary | ICD-10-CM

## 2024-03-03 RX ORDER — FLUCONAZOLE 150 MG/1
150 TABLET ORAL ONCE
Qty: 1 TABLET | Refills: 1 | Status: SHIPPED | OUTPATIENT
Start: 2024-03-03 | End: 2024-03-03

## 2024-03-07 ENCOUNTER — NURSE TRIAGE (OUTPATIENT)
Age: 19
End: 2024-03-07

## 2024-03-07 NOTE — TELEPHONE ENCOUNTER
"Patient called, believes she has a yeast infection. Was an antibiotic(s) within the last 2 weeks for sinus infection. Vaginal white cottage cheese discharge and vaginal irritation. Patient denies odor, rash, dysuria.  Patient took 1 dose of Diflucan last week and symptoms still remain. Appointment scheduled for tomorrow. Reviewed coconut oil to apply for vaginal irritation and itching.       Reason for Disposition   Symptoms of a yeast infection' (i.e., itchy, white discharge, not bad smelling) and not improved > 3 days following Care Advice    Answer Assessment - Initial Assessment Questions  1. DISCHARGE: \"Describe the discharge.\" (e.g., white, yellow, green, gray, foamy, cottage cheese-like)      Cottage cheese discharge   2. ODOR: \"Is there a bad odor?\"      Denies   3. ONSET: \"When did the discharge begin?\"      Last week - was tx with 1 dose Diflucan   4. RASH: \"Is there a rash in that area?\" If Yes, ask: \"Describe it.\" (e.g., redness, blisters, sores, bumps)      Denies  5. ABDOMINAL PAIN: \"Are you having any abdominal pain?\" If Yes, ask: \"What does it feel like? \" (e.g., crampy, dull, intermittent, constant)       Denies  7. CAUSE: \"What do you think is causing the discharge?\" \"Have you had the same problem before? What happened then?\"      Unknown - has not changed soaps or lotions. Dove sensitive skin. Patient was on an antibiotic(s) for sinus infection within the last two weeks   8. OTHER SYMPTOMS: \"Do you have any other symptoms?\" (e.g., fever, itching, vaginal bleeding, pain with urination, injury to genital area, vaginal foreign body)      Vaginal itching   9. PREGNANCY: \"Is there any chance you are pregnant?\" \"When was your last menstrual period?\"      IUD    Protocols used: Vaginal Discharge-ADULT-[place]    "

## 2024-03-08 ENCOUNTER — OFFICE VISIT (OUTPATIENT)
Dept: OBGYN CLINIC | Facility: MEDICAL CENTER | Age: 19
End: 2024-03-08
Payer: COMMERCIAL

## 2024-03-08 VITALS
BODY MASS INDEX: 27.84 KG/M2 | HEIGHT: 67 IN | WEIGHT: 177.4 LBS | DIASTOLIC BLOOD PRESSURE: 74 MMHG | SYSTOLIC BLOOD PRESSURE: 120 MMHG

## 2024-03-08 DIAGNOSIS — L29.2 VULVAR ITCHING: Primary | ICD-10-CM

## 2024-03-08 LAB
BV WHIFF TEST VAG QL: NORMAL
CLUE CELLS SPEC QL WET PREP: NORMAL
PH SMN: 4.5 [PH]
SL AMB POCT WET MOUNT: NORMAL
T VAGINALIS VAG QL WET PREP: NORMAL
YEAST VAG QL WET PREP: NORMAL

## 2024-03-08 PROCEDURE — 99213 OFFICE O/P EST LOW 20 MIN: CPT | Performed by: NURSE PRACTITIONER

## 2024-03-08 PROCEDURE — 87210 SMEAR WET MOUNT SALINE/INK: CPT | Performed by: NURSE PRACTITIONER

## 2024-03-08 RX ORDER — NYSTATIN AND TRIAMCINOLONE ACETONIDE 100000; 1 [USP'U]/G; MG/G
OINTMENT TOPICAL 2 TIMES DAILY
Qty: 30 G | Refills: 0 | Status: SHIPPED | OUTPATIENT
Start: 2024-03-08

## 2024-03-08 RX ORDER — KETOCONAZOLE 20 MG/ML
SHAMPOO TOPICAL
COMMUNITY
Start: 2024-02-28

## 2024-03-08 RX ORDER — LEVONORGESTREL 19.5 MG/1
1 INTRAUTERINE DEVICE INTRAUTERINE
COMMUNITY

## 2024-03-08 NOTE — PROGRESS NOTES
"Assessment/Plan:      Diagnoses and all orders for this visit:    Vulvar itching  -     POCT wet mount  -     nystatin-triamcinolone (MYCOLOG-II) ointment; Apply topically 2 (two) times a day    Other orders  -     ketoconazole (NIZORAL) 2 % shampoo  -     levonorgestrel (Kyleena) 19.5 MG intrauterine device; 1 each by Intrauterine route Once every 5 years        -Reviewed vulvar itching and treatment with Mycolog.  Written information provided  -Hygiene reviewed including avoid scented soaps, lotions and lubricants; avoid tight/restrictive clothing; avoid douching  -Wet mount/KOH negative for vaginal infection  -Signs and symptoms to report reviewed    RTO 6/2024 for annual exam or sooner as needed    Subjective:     Patient ID: Milvia Colvin is a 18 y.o. female.    HPI P0 here with complaints of vulvar itching.  LMP 3/8/24  Was recently on antibiotics.  Discharge is described minimal.  Associated symptoms include external vulvar itching.  Denies alleviating or aggravating factors.  Has used coconut oil OTC with minimal to moderate amount of relief.  Was prescribed and took 1 dose of fluconazole without significant improvement on 3/3/24. Is sexually active using Kyleena for contraception.  Denies new partner, fever, chills, pelvic pain, bowel or bladder concerns    Pap smear N/A  Last gonorrhea/chlamydia 6/16/23 negative  Completed Gardasil vaccine series    Review of Systems   Constitutional:  Negative for chills, fatigue and fever.   Respiratory: Negative.     Cardiovascular: Negative.    Genitourinary:  Negative for decreased urine volume, difficulty urinating, dysuria, enuresis, flank pain, frequency, genital sores, hematuria, menstrual problem, pelvic pain, urgency, vaginal bleeding, vaginal discharge and vaginal pain.        Vulvar itching          Objective:  /74   Ht 5' 6.5\" (1.689 m)   Wt 80.5 kg (177 lb 6.4 oz)   BMI 28.20 kg/m²      Physical Exam  Vitals reviewed. Exam conducted with a " chaperone present.   Constitutional:       Appearance: Normal appearance.   Abdominal:      Hernia: There is no hernia in the left inguinal area or right inguinal area.   Genitourinary:     Exam position: Lithotomy position.      Vagina: Normal.      Cervix: Normal.            Comments: Redness and irritation labia minora/majora.  Minimal amount of white exudate  IUD string easily visualized on speculum exam is approximately 2 cm  Lymphadenopathy:      Lower Body: No right inguinal adenopathy. No left inguinal adenopathy.   Neurological:      Mental Status: She is alert and oriented to person, place, and time.   Psychiatric:         Mood and Affect: Mood normal.         Behavior: Behavior normal.

## 2024-03-29 ENCOUNTER — TELEPHONE (OUTPATIENT)
Dept: DERMATOLOGY | Facility: CLINIC | Age: 19
End: 2024-03-29

## 2024-03-29 NOTE — TELEPHONE ENCOUNTER
Called patient from [facility] that our office received a (ASAP from 11/2023) referral from her Dr's office to schedule her an appointment with  Dermatology, if you are still interested in making an appointment please give our office a call at [phone]

## 2024-04-22 DIAGNOSIS — F32.81 PMDD (PREMENSTRUAL DYSPHORIC DISORDER): ICD-10-CM

## 2024-04-22 RX ORDER — ESCITALOPRAM OXALATE 10 MG/1
10 TABLET ORAL DAILY
Qty: 90 TABLET | Refills: 0 | Status: SHIPPED | OUTPATIENT
Start: 2024-04-22 | End: 2024-04-22 | Stop reason: SDUPTHER

## 2024-04-23 RX ORDER — ESCITALOPRAM OXALATE 10 MG/1
10 TABLET ORAL DAILY
Qty: 90 TABLET | Refills: 1 | Status: SHIPPED | OUTPATIENT
Start: 2024-04-23

## 2024-05-29 ENCOUNTER — HOSPITAL ENCOUNTER (EMERGENCY)
Facility: HOSPITAL | Age: 19
Discharge: HOME | End: 2024-05-29
Attending: EMERGENCY MEDICINE
Payer: COMMERCIAL

## 2024-05-29 ENCOUNTER — OFFICE VISIT (OUTPATIENT)
Dept: URGENT CARE | Age: 19
End: 2024-05-29
Payer: COMMERCIAL

## 2024-05-29 VITALS
HEART RATE: 83 BPM | TEMPERATURE: 98.6 F | BODY MASS INDEX: 28.14 KG/M2 | OXYGEN SATURATION: 96 % | SYSTOLIC BLOOD PRESSURE: 123 MMHG | WEIGHT: 177 LBS | DIASTOLIC BLOOD PRESSURE: 67 MMHG | RESPIRATION RATE: 20 BRPM

## 2024-05-29 VITALS
DIASTOLIC BLOOD PRESSURE: 69 MMHG | RESPIRATION RATE: 18 BRPM | TEMPERATURE: 98.9 F | HEART RATE: 87 BPM | SYSTOLIC BLOOD PRESSURE: 115 MMHG | OXYGEN SATURATION: 96 %

## 2024-05-29 DIAGNOSIS — H65.192 OTHER ACUTE NONSUPPURATIVE OTITIS MEDIA OF LEFT EAR, RECURRENCE NOT SPECIFIED: Primary | ICD-10-CM

## 2024-05-29 DIAGNOSIS — J02.9 SORE THROAT: ICD-10-CM

## 2024-05-29 DIAGNOSIS — L50.9 URTICARIA: Primary | ICD-10-CM

## 2024-05-29 DIAGNOSIS — H66.92 LEFT OTITIS MEDIA: ICD-10-CM

## 2024-05-29 DIAGNOSIS — J06.9 ACUTE URI: ICD-10-CM

## 2024-05-29 LAB — S PYO AG THROAT QL: NEGATIVE

## 2024-05-29 PROCEDURE — 99283 EMERGENCY DEPT VISIT LOW MDM: CPT

## 2024-05-29 PROCEDURE — 87880 STREP A ASSAY W/OPTIC: CPT

## 2024-05-29 PROCEDURE — 99284 EMERGENCY DEPT VISIT MOD MDM: CPT | Performed by: EMERGENCY MEDICINE

## 2024-05-29 PROCEDURE — 99213 OFFICE O/P EST LOW 20 MIN: CPT

## 2024-05-29 RX ORDER — PREDNISONE 20 MG/1
20 TABLET ORAL DAILY
Qty: 4 TABLET | Refills: 0 | Status: SHIPPED | OUTPATIENT
Start: 2024-05-29 | End: 2024-06-02

## 2024-05-29 RX ORDER — PREDNISONE 20 MG/1
20 TABLET ORAL ONCE
Status: COMPLETED | OUTPATIENT
Start: 2024-05-29 | End: 2024-05-29

## 2024-05-29 RX ORDER — FAMOTIDINE 20 MG/1
20 TABLET, FILM COATED ORAL ONCE
Status: COMPLETED | OUTPATIENT
Start: 2024-05-29 | End: 2024-05-29

## 2024-05-29 RX ORDER — AMOXICILLIN AND CLAVULANATE POTASSIUM 875; 125 MG/1; MG/1
1 TABLET, FILM COATED ORAL EVERY 12 HOURS SCHEDULED
Qty: 10 TABLET | Refills: 0 | Status: SHIPPED | OUTPATIENT
Start: 2024-05-29 | End: 2024-06-03

## 2024-05-29 RX ORDER — FLUCONAZOLE 150 MG/1
150 TABLET ORAL ONCE
Qty: 1 TABLET | Refills: 0 | Status: SHIPPED | OUTPATIENT
Start: 2024-05-29 | End: 2024-05-29

## 2024-05-29 RX ORDER — AZITHROMYCIN 250 MG/1
TABLET, FILM COATED ORAL
Qty: 6 TABLET | Refills: 0 | Status: SHIPPED | OUTPATIENT
Start: 2024-05-29 | End: 2024-06-02

## 2024-05-29 RX ADMIN — PREDNISONE 20 MG: 20 TABLET ORAL at 21:20

## 2024-05-29 RX ADMIN — FAMOTIDINE 20 MG: 20 TABLET, FILM COATED ORAL at 21:20

## 2024-05-29 NOTE — PROGRESS NOTES
[facility]        NAME: Milvia Colvin is a 18 y.o. female  : 2005    MRN: [id number]  DATE: May 29, 2024  TIME: 2:56 PM    Assessment and Plan   Other acute nonsuppurative otitis media of left ear, recurrence not specified [H65.192]  1. Other acute nonsuppurative otitis media of left ear, recurrence not specified  amoxicillin-clavulanate (AUGMENTIN) 875-125 mg per tablet      2. Sore throat  POCT rapid ANTIGEN strepA      3. Acute URI              Patient Instructions       Follow up with PCP in 3-5 days.  Proceed to  ER if symptoms worsen.    If tests have been performed at [facility], our office will contact you with results if changes need to be made to the care plan discussed with you at the visit.  [facility]    Chief Complaint     Chief Complaint   Patient presents with    Cold Like Symptoms     Pt started Sat with nasal congestion, drainage, sore throat, left ear pain and cough. Denies fevers. Taking Claritin, Sinus H/A med, Flonase spray and Netti pot.           History of Present Illness       18-year-old female presents for URI-like symptoms x 4 days.  Patient admits to runny nose, congestion, sore throat, coughing.  2 days later neon green nasal mucus developed.  This morning she developed sharp stabbing left ear pain.  Denies any known sick contacts.  Using Tylenol, allergy medicine as needed.    Earache   There is pain in the left ear. This is a new problem. The current episode started yesterday. The problem occurs constantly. The problem has been gradually worsening. There has been no fever. The pain is at a severity of 5/10. Associated symptoms include coughing, headaches, hearing loss, neck pain, rhinorrhea and a sore throat. Pertinent negatives include no abdominal pain, diarrhea, ear discharge, rash or vomiting.       Review of Systems   Review of Systems   HENT:  Positive for ear pain, hearing loss, rhinorrhea and sore throat.  Negative for ear discharge.    Respiratory:  Positive for cough.    Gastrointestinal:  Negative for abdominal pain, diarrhea and vomiting.   Musculoskeletal:  Positive for neck pain.   Skin:  Negative for rash.   Neurological:  Positive for headaches.         Current Medications       Current Outpatient Medications:     Acetaminophen-Caffeine 500-65 MG TABS, Take 1 tablet by mouth as needed for migraine, Disp: , Rfl:     amoxicillin-clavulanate (AUGMENTIN) 875-125 mg per tablet, Take 1 tablet by mouth every 12 (twelve) hours for 5 days, Disp: 10 tablet, Rfl: 0    Ciclopirox 1 % shampoo, Apply 1 Application topically 2 (two) times a week, Disp: 120 mL, Rfl: 0    escitalopram (LEXAPRO) 10 mg tablet, Take 1 tablet (10 mg total) by mouth daily, Disp: 90 tablet, Rfl: 1    ketoconazole (NIZORAL) 2 % shampoo, , Disp: , Rfl:     levonorgestrel (Kyleena) 19.5 MG intrauterine device, 1 each by Intrauterine route Once every 5 years, Disp: , Rfl:     Multiple Vitamin (MULTI VITAMIN DAILY PO), Take 1 tablet by mouth daily, Disp: , Rfl:     ondansetron (ZOFRAN) 4 mg tablet, Take 1 tablet (4 mg total) by mouth every 8 (eight) hours as needed for nausea or vomiting, Disp: 10 tablet, Rfl: 0    SUMAtriptan (Imitrex) 50 mg tablet, Take 1 tablet (50 mg total) by mouth 2 (two) times a day as needed for migraine for up to 9 doses, Disp: 9 tablet, Rfl: 0    clindamycin (CLINDAGEL) 1 % gel, Apply topically 2 (two) times a day Apply to affected areas around the mouth., Disp: 30 g, Rfl: 0    fluocinonide (LIDEX) 0.05 % external solution, Apply topically 3 (three) times a day Apply to scalp. (Patient not taking: Reported on 5/29/2024), Disp: 60 mL, Rfl: 1    nystatin-triamcinolone (MYCOLOG-II) ointment, Apply topically 2 (two) times a day (Patient not taking: Reported on 5/29/2024), Disp: 30 g, Rfl: 0    Current Allergies     Allergies as of 05/29/2024    (No Known Allergies)            The following portions of the patient's history were  reviewed and updated as appropriate: allergies, current medications, past family history, past medical history, past social history, past surgical history and problem list.     Past Medical History:   Diagnosis Date    Concussion with no loss of consciousness 10/08/2019    Migraine     Obstructive sleep apnea, pediatric     Right ankle sprain 01/08/2018       Past Surgical History:   Procedure Laterality Date    TONSILLECTOMY AND ADENOIDECTOMY  08/2008    resolved: 8/2008    WISDOM TOOTH EXTRACTION  08/07/2020       Family History   Problem Relation Age of Onset    Otosclerosis Mother     No Known Problems Father     Asthma Sister     Hypertension Maternal Grandmother     Skin cancer Maternal Grandmother     Leukemia Maternal Grandmother         CLL    Alcohol abuse Maternal Grandfather     Stroke Maternal Grandfather     Cirrhosis Maternal Grandfather         hepatic    Anxiety disorder Paternal Grandfather     Depression Paternal Grandfather     Suicidality Paternal Grandfather          Medications have been verified.        Objective   /67 (BP Location: Right arm, Patient Position: Sitting, Cuff Size: Standard)   Pulse 83   Temp 98.6 °F (37 °C) (Tympanic)   Resp 20   Wt 80.3 kg (177 lb)   LMP 05/05/2024 (Exact Date)   SpO2 96%   BMI 28.14 kg/m²   Patient's last menstrual period was 05/05/2024 (exact date).       Physical Exam     Physical Exam  Vitals and nursing note reviewed.   Constitutional:       General: She is not in acute distress.     Appearance: She is not toxic-appearing.   HENT:      Head: Normocephalic and atraumatic.      Right Ear: Tympanic membrane, ear canal and external ear normal.      Left Ear: Ear canal and external ear normal.      Ears:      Comments: Left TM erythematous and bulging     Nose: Rhinorrhea present.      Mouth/Throat:      Mouth: Mucous membranes are moist.      Pharynx: Posterior oropharyngeal erythema present. No oropharyngeal exudate.   Eyes:       Conjunctiva/sclera: Conjunctivae normal.   Pulmonary:      Effort: Pulmonary effort is normal.   Lymphadenopathy:      Cervical: No cervical adenopathy.   Neurological:      Mental Status: She is alert.   Psychiatric:         Mood and Affect: Mood normal.         Behavior: Behavior normal.

## 2024-05-30 NOTE — DISCHARGE INSTRUCTIONS
Start taking zithromax for ear infection.     Prednisone and benadryl for hives.     If you develop new or worsening symptoms, please return to the Emergency Department for further evaluation.

## 2024-05-30 NOTE — ED PROVIDER NOTES
History  Chief Complaint   Patient presents with    Rash     Pt with rash.  Pt just took medication for ear infections. Pt with sore throat and treated with augmentin.  Pt denies any swelling of throat or tongue.  Pt took 1 benadryl at 1930     HPI    Patient is a 18-year-old female presenting for evaluation of acute onset of rash.  Patient states she was seen at urgent care earlier today for evaluation of left ear pain, congestion.  Patient was started on Augmentin to treat for an ear infection.  She states about an hour and a half after taking her first dose she started to have a urticarial type rash diffusely.  Rash is pruritic in nature.  She states she took at the same time and over-the-counter antifungal and a probiotic.  She states she is taken Augmentin before without any rash.  She denies any facial swelling, difficulty breathing, nausea, vomiting.  Did try Benadryl without improvement.    Prior to Admission Medications   Prescriptions Last Dose Informant Patient Reported? Taking?   Acetaminophen-Caffeine 500-65 MG TABS  Self Yes No   Sig: Take 1 tablet by mouth as needed for migraine   Ciclopirox 1 % shampoo   No No   Sig: Apply 1 Application topically 2 (two) times a week   Multiple Vitamin (MULTI VITAMIN DAILY PO)  Self Yes No   Sig: Take 1 tablet by mouth daily   SUMAtriptan (Imitrex) 50 mg tablet   No No   Sig: Take 1 tablet (50 mg total) by mouth 2 (two) times a day as needed for migraine for up to 9 doses   amoxicillin-clavulanate (AUGMENTIN) 875-125 mg per tablet   No No   Sig: Take 1 tablet by mouth every 12 (twelve) hours for 5 days   clindamycin (CLINDAGEL) 1 % gel   No No   Sig: Apply topically 2 (two) times a day Apply to affected areas around the mouth.   escitalopram (LEXAPRO) 10 mg tablet   No No   Sig: Take 1 tablet (10 mg total) by mouth daily   fluocinonide (LIDEX) 0.05 % external solution   No No   Sig: Apply topically 3 (three) times a day Apply to scalp.   Patient not taking: Reported  on 2024   ketoconazole (NIZORAL) 2 % shampoo   Yes No   levonorgestrel (Kyleena) 19.5 MG intrauterine device   Yes No   Si each by Intrauterine route Once every 5 years   nystatin-triamcinolone (MYCOLOG-II) ointment   No No   Sig: Apply topically 2 (two) times a day   Patient not taking: Reported on 2024   ondansetron (ZOFRAN) 4 mg tablet   No No   Sig: Take 1 tablet (4 mg total) by mouth every 8 (eight) hours as needed for nausea or vomiting      Facility-Administered Medications: None       Past Medical History:   Diagnosis Date    Concussion with no loss of consciousness 10/08/2019    Migraine     Obstructive sleep apnea, pediatric     Right ankle sprain 2018       Past Surgical History:   Procedure Laterality Date    TONSILLECTOMY AND ADENOIDECTOMY  2008    resolved: 2008    WISDOM TOOTH EXTRACTION  2020       Family History   Problem Relation Age of Onset    Otosclerosis Mother     No Known Problems Father     Asthma Sister     Hypertension Maternal Grandmother     Skin cancer Maternal Grandmother     Leukemia Maternal Grandmother         CLL    Alcohol abuse Maternal Grandfather     Stroke Maternal Grandfather     Cirrhosis Maternal Grandfather         hepatic    Anxiety disorder Paternal Grandfather     Depression Paternal Grandfather     Suicidality Paternal Grandfather      I have reviewed and agree with the history as documented.    E-Cigarette/Vaping    E-Cigarette Use Never User      E-Cigarette/Vaping Substances    Nicotine No     THC No     CBD No     Flavoring No     Other No     Unknown No      Social History     Tobacco Use    Smoking status: Never    Smokeless tobacco: Never   Vaping Use    Vaping status: Never Used   Substance Use Topics    Alcohol use: No    Drug use: Never        Review of Systems   Constitutional:  Negative for chills and fever.   HENT:  Positive for ear pain. Negative for sore throat.    Eyes:  Negative for pain and visual disturbance.    Respiratory:  Negative for cough and shortness of breath.    Cardiovascular:  Negative for chest pain and palpitations.   Gastrointestinal:  Negative for abdominal pain and vomiting.   Genitourinary:  Negative for dysuria and hematuria.   Musculoskeletal:  Negative for arthralgias and back pain.   Skin:  Positive for rash. Negative for color change.   Neurological:  Negative for seizures and syncope.   All other systems reviewed and are negative.      Physical Exam  ED Triage Vitals [05/29/24 2033]   Temperature Pulse Respirations Blood Pressure SpO2   98.9 °F (37.2 °C) 87 18 115/69 96 %      Temp Source Heart Rate Source Patient Position - Orthostatic VS BP Location FiO2 (%)   Oral -- Sitting Right arm --      Pain Score       --             Orthostatic Vital Signs  Vitals:    05/29/24 2033   BP: 115/69   Pulse: 87   Patient Position - Orthostatic VS: Sitting       Physical Exam  Vitals and nursing note reviewed.   Constitutional:       General: She is not in acute distress.  HENT:      Head: Normocephalic and atraumatic.      Right Ear: Tympanic membrane and external ear normal.      Left Ear: External ear normal. Tympanic membrane is erythematous and bulging.      Nose: Nose normal.      Mouth/Throat:      Pharynx: Oropharynx is clear. No oropharyngeal exudate or posterior oropharyngeal erythema.   Eyes:      Extraocular Movements: Extraocular movements intact.      Pupils: Pupils are equal, round, and reactive to light.   Cardiovascular:      Rate and Rhythm: Normal rate and regular rhythm.      Pulses: Normal pulses.      Heart sounds: Normal heart sounds. No murmur heard.     No friction rub. No gallop.   Pulmonary:      Effort: Pulmonary effort is normal. No respiratory distress.      Breath sounds: Normal breath sounds. No wheezing, rhonchi or rales.   Abdominal:      General: Abdomen is flat. There is no distension.      Palpations: Abdomen is soft.      Tenderness: There is no abdominal tenderness. There  is no guarding or rebound.   Musculoskeletal:         General: No deformity. Normal range of motion.      Cervical back: Normal range of motion.      Right lower leg: No edema.      Left lower leg: No edema.   Skin:     General: Skin is warm and dry.      Capillary Refill: Capillary refill takes less than 2 seconds.      Findings: Rash present.      Comments: Diffuse macular rash, blanching, no petechiae present, no skin sloughing, no mucous membrane involvement   Neurological:      General: No focal deficit present.      Mental Status: She is alert and oriented to person, place, and time.      Gait: Gait normal.   Psychiatric:         Mood and Affect: Mood normal.         ED Medications  Medications   famotidine (PEPCID) tablet 20 mg (20 mg Oral Given 5/29/24 2120)   predniSONE tablet 20 mg (20 mg Oral Given 5/29/24 2120)       Diagnostic Studies  Results Reviewed       None                   No orders to display         Procedures  Procedures      ED Course                                       Medical Decision Making  18-year-old female presenting for acute onset of rash following initiation of Augmentin.  Vital signs are stable.  There is no evidence for anaphylaxis on exam.  Patient has photos with her of what the rash initially look like, initially more blotchy and consistent with urticaria, currently rash more macular in appearance.  Rash most likely related to initiation of Augmentin, less likely viral.  Recommend switching antibiotic to azithromycin as patient does have acute otitis media on exam.  Will start patient on prednisone to treat the urticaria.  Discussed return precautions for signs symptoms of anaphylaxis, patient and mother feel comfortable with this plan.    Risk  Prescription drug management.          Disposition  Final diagnoses:   Urticaria   Left otitis media     Time reflects when diagnosis was documented in both MDM as applicable and the Disposition within this note       Time User  Action Codes Description Comment    5/29/2024  9:11 PM Hugo Ha Add [L50.9] Urticaria     5/29/2024  9:14 PM Hugo Ha Add [H66.92] Left otitis media           ED Disposition       ED Disposition   Discharge    Condition   Stable    Date/Time   Wed May 29, 2024  9:11 PM    Comment   Milvia SOLOMON Marii discharge to home/self care.                   Follow-up Information       Follow up With Specialties Details Why Contact Info Additional Information    Fabienne Diane PA-C Family Medicine, Physician Assistant Schedule an appointment as soon as possible for a visit  As needed [address]  [address]  [facility] [id number] [phone]       [facility] Emergency Department Emergency Medicine  If symptoms worsen [address] [facility] [address]  [phone] [facility] Emergency Department, [address], [place], [address]            Discharge Medication List as of 5/29/2024  9:22 PM        START taking these medications    Details   azithromycin (ZITHROMAX) 250 mg tablet Take 2 tablets today then 1 tablet daily x 4 days, Normal      fluconazole (DIFLUCAN) 150 mg tablet Take 1 tablet (150 mg total) by mouth once for 1 dose, Starting Wed 5/29/2024, Normal      predniSONE 20 mg tablet Take 1 tablet (20 mg total) by mouth daily for 4 days, Starting Wed 5/29/2024, Until Sun 6/2/2024, Normal           CONTINUE these medications which have NOT CHANGED    Details   Acetaminophen-Caffeine 500-65 MG TABS Take 1 tablet by mouth as needed for migraine, Historical Med      amoxicillin-clavulanate (AUGMENTIN) 875-125 mg per tablet Take 1 tablet by mouth every 12 (twelve) hours for 5 days, Starting Wed 5/29/2024, Until Mon 6/3/2024, Normal      Ciclopirox 1 % shampoo Apply 1 Application topically 2 (two) times a week, Starting Mon 6/19/2023, Normal      clindamycin (CLINDAGEL) 1 % gel Apply topically 2 (two) times a day Apply to affected areas around the  mouth., Starting Fri 5/5/2023, Normal      escitalopram (LEXAPRO) 10 mg tablet Take 1 tablet (10 mg total) by mouth daily, Starting Tue 4/23/2024, Normal      fluocinonide (LIDEX) 0.05 % external solution Apply topically 3 (three) times a day Apply to scalp., Starting Mon 11/13/2023, Normal      ketoconazole (NIZORAL) 2 % shampoo Historical Med      levonorgestrel (Kyleena) 19.5 MG intrauterine device 1 each by Intrauterine route Once every 5 years, Historical Med      Multiple Vitamin (MULTI VITAMIN DAILY PO) Take 1 tablet by mouth daily, Historical Med      nystatin-triamcinolone (MYCOLOG-II) ointment Apply topically 2 (two) times a day, Starting Fri 3/8/2024, Normal      ondansetron (ZOFRAN) 4 mg tablet Take 1 tablet (4 mg total) by mouth every 8 (eight) hours as needed for nausea or vomiting, Starting Wed 12/20/2023, Normal      SUMAtriptan (Imitrex) 50 mg tablet Take 1 tablet (50 mg total) by mouth 2 (two) times a day as needed for migraine for up to 9 doses, Starting Fri 10/28/2022, Normal           No discharge procedures on file.    PDMP Review       None             ED Provider  Attending physically available and evaluated Milvia Colvin. I managed the patient along with the ED Attending.    Electronically Signed by           Hugo Ha MD  05/29/24 [id number]

## 2024-05-30 NOTE — ED ATTENDING ATTESTATION
5/29/2024  I, Jean Pierre Mar MD, saw and evaluated the patient. I have discussed the patient with the resident/non-physician practitioner and agree with the resident's/non-physician practitioner's findings, Plan of Care, and MDM as documented in the resident's/non-physician practitioner's note, except where noted. All available labs and Radiology studies were reviewed.  I was present for key portions of any procedure(s) performed by the resident/non-physician practitioner and I was immediately available to provide assistance.       At this point I agree with the current assessment done in the Emergency Department.  I have conducted an independent evaluation of this patient a history and physical is as follows:  Patient developed generalized pruritic rash.  No respiratory distress.  Patient is on Augmentin for otitis media.  Physical exam: No facial swelling.  No respiratory distress.  Clear lungs.  Faint patchy red rash.  Assessment/plan: Most likely urticaria secondary to penicillin.  Stop Augmentin.  Start Benadryl and prednisone.  Change antibiotic to Zithromax.  Appropriate for discharge and outpatient management.  ED Course         Critical Care Time  Procedures

## 2024-06-10 ENCOUNTER — OFFICE VISIT (OUTPATIENT)
Dept: FAMILY MEDICINE CLINIC | Facility: CLINIC | Age: 19
End: 2024-06-10
Payer: COMMERCIAL

## 2024-06-10 VITALS
WEIGHT: 188 LBS | OXYGEN SATURATION: 97 % | HEART RATE: 113 BPM | SYSTOLIC BLOOD PRESSURE: 100 MMHG | BODY MASS INDEX: 29.89 KG/M2 | DIASTOLIC BLOOD PRESSURE: 60 MMHG | TEMPERATURE: 98.1 F

## 2024-06-10 DIAGNOSIS — H65.02 NON-RECURRENT ACUTE SEROUS OTITIS MEDIA OF LEFT EAR: Primary | ICD-10-CM

## 2024-06-10 DIAGNOSIS — L50.9 URTICARIA: ICD-10-CM

## 2024-06-10 PROBLEM — M54.50 ACUTE LOW BACK PAIN WITHOUT SCIATICA: Status: RESOLVED | Noted: 2023-06-19 | Resolved: 2024-06-10

## 2024-06-10 PROBLEM — L71.0 PERIORAL DERMATITIS: Status: RESOLVED | Noted: 2023-05-23 | Resolved: 2024-06-10

## 2024-06-10 PROCEDURE — 99213 OFFICE O/P EST LOW 20 MIN: CPT | Performed by: FAMILY MEDICINE

## 2024-06-10 RX ORDER — FLUTICASONE PROPIONATE 50 MCG
2 SPRAY, SUSPENSION (ML) NASAL DAILY
Qty: 16 G | Refills: 1 | Status: SHIPPED | OUTPATIENT
Start: 2024-06-10

## 2024-06-10 NOTE — PATIENT INSTRUCTIONS
Reviewed urgent care visit May 29, received prescription for Augmentin, was seen later that day at emergency room with rash.  Has used Augmentin without issue in the past, no prior penicillin allergy.  As per notes did have urticaria, may have had reaction to Augmentin, I would avoid penicillins for now.  Video she has of rash does appear to be urticaria/drug eruption - she does request evaluation with allergist to see if she can take penicillin in the future.    Does continue with distortion left TM, serous otitis, is status post prednisone 20 mg daily for 5 days, azithromycin.  I would like her to start Flonase 2 sprays each nostril every day for the next 2 weeks.  If she is not improving in 1 week I would like her to see ENT.  If symptoms worsen may need other antibiotics/further prednisone.

## 2024-06-10 NOTE — PROGRESS NOTES
FAMILY PRACTICE OFFICE VISIT  Josse Smith D.O.    [facility]  [facility]  [address]  [address]  [place], [address]      NAME: Milvia Colvin  AGE: 18 y.o. SEX: female  : 2005   MRN: [id number]    DATE: 6/10/2024  TIME: 3:06 PM      Assessment and Plan     1. Non-recurrent acute serous otitis media of left ear  -     fluticasone (FLONASE) 50 mcg/act nasal spray; 2 sprays into each nostril daily  -     Ambulatory Referral to Otolaryngology; Future  2. Urticaria  -     Ambulatory Referral to Allergy; Future      Patient Instructions   Reviewed urgent care visit May 29, received prescription for Augmentin, was seen later that day at emergency room with rash.  Has used Augmentin without issue in the past, no prior penicillin allergy.  As per notes did have urticaria, may have had reaction to Augmentin, I would avoid penicillins for now.  Video she has of rash does appear to be urticaria/drug eruption - she does request evaluation with allergist to see if she can take penicillin in the future.    Does continue with distortion left TM, serous otitis, is status post prednisone 20 mg daily for 5 days, azithromycin.  I would like her to start Flonase 2 sprays each nostril every day for the next 2 weeks.  If she is not improving in 1 week I would like her to see ENT.  If symptoms worsen may need other antibiotics/further prednisone.    Chief Complaint     Chief Complaint   Patient presents with    Earache       History of Present Illness   Milvia Colvin is a 18 y.o.-year-old female who is in with left ear pain.  She has started with pain late May, was seen May 29 at urgent care, received Augmentin for otitis on left, had used that in the past without issue.  She took 1 dose of that, also had used 1 dose of OTC Azo, broke out in hives/rash, was seen at emergency room May 29.  Received dose of Pepcid along with course of prednisone, 20 mg daily for 5 days.  Rash  essentially resolved.    Continues with pressure left ear, muffled hearing, no pain.      Review of Systems   Review of Systems   Constitutional:  Negative for chills and fever.   HENT:  Positive for congestion (Had congestion associated with onset of ear pain.  No history of ear tubes, had seen ENT years ago when she was younger).    Respiratory:          She has had no wheezing, no chest pain       Active Problem List     Patient Active Problem List   Diagnosis    Seasonal allergies    Migraine    PMDD (premenstrual dysphoric disorder)    Birth control counseling    IUD (intrauterine device) in place    Expulsion of intrauterine contraceptive device (HCC)    Eczematous dermatitis of upper and lower eyelids of both eyes    Vitamin D insufficiency    Overweight (BMI 25.0-29.9)    Seborrheic dermatitis    Non-recurrent acute serous otitis media of left ear       Past Medical History:  Reviewed    Past Surgical History:  Reviewed    Family History:  Reviewed    Social History:  Reviewed    Objective     Vitals:    06/10/24 1400   BP: 100/60   BP Location: Left arm   Cuff Size: Large   Pulse: (!) 113   Temp: 98.1 °F (36.7 °C)   TempSrc: Tympanic   SpO2: 97%   Weight: 85.3 kg (188 lb)     Body mass index is 29.89 kg/m².    BP Readings from Last 3 Encounters:   06/10/24 100/60   05/29/24 115/69   05/29/24 123/67       Wt Readings from Last 3 Encounters:   06/10/24 85.3 kg (188 lb)   05/29/24 80.3 kg (177 lb)   03/08/24 80.5 kg (177 lb 6.4 oz)       Physical Exam  Constitutional:       General: She is not in acute distress.     Appearance: Normal appearance. She is not ill-appearing.   HENT:      Ears:      Comments: Right TM is clear/intact, no redness.  Left TM with distortion, retraction, some fluid, redness.  Canal is okay.  Neck is supple, no swollen glands.     Mouth/Throat:      Pharynx: Oropharynx is clear.   Pulmonary:      Effort: No respiratory distress.      Breath sounds: No wheezing, rhonchi or rales.    Skin:     Comments: Has some dry skin/minor rash on hands at present, she showed me a video of rash she had on May 29, diffuse patches of redness, multiple areas, urticarial appearance   Neurological:      Mental Status: She is alert.         ALLERGIES:  Allergies   Allergen Reactions    Augmentin [Amoxicillin-Pot Clavulanate] Rash     Poss rash related to dose 5/29/24  ?    Used this in past without issue       Current Medications     Current Outpatient Medications   Medication Sig Dispense Refill    fluticasone (FLONASE) 50 mcg/act nasal spray 2 sprays into each nostril daily 16 g 1    Acetaminophen-Caffeine 500-65 MG TABS Take 1 tablet by mouth as needed for migraine      Ciclopirox 1 % shampoo Apply 1 Application topically 2 (two) times a week 120 mL 0    clindamycin (CLINDAGEL) 1 % gel Apply topically 2 (two) times a day Apply to affected areas around the mouth. 30 g 0    escitalopram (LEXAPRO) 10 mg tablet Take 1 tablet (10 mg total) by mouth daily 90 tablet 1    fluocinonide (LIDEX) 0.05 % external solution Apply topically 3 (three) times a day Apply to scalp. (Patient not taking: Reported on 5/29/2024) 60 mL 1    ketoconazole (NIZORAL) 2 % shampoo       levonorgestrel (Kyleena) 19.5 MG intrauterine device 1 each by Intrauterine route Once every 5 years      Multiple Vitamin (MULTI VITAMIN DAILY PO) Take 1 tablet by mouth daily      nystatin-triamcinolone (MYCOLOG-II) ointment Apply topically 2 (two) times a day (Patient not taking: Reported on 5/29/2024) 30 g 0    ondansetron (ZOFRAN) 4 mg tablet Take 1 tablet (4 mg total) by mouth every 8 (eight) hours as needed for nausea or vomiting 10 tablet 0    SUMAtriptan (Imitrex) 50 mg tablet Take 1 tablet (50 mg total) by mouth 2 (two) times a day as needed for migraine for up to 9 doses 9 tablet 0     No current facility-administered medications for this visit.            Orders Placed This Encounter   Procedures    Ambulatory Referral to Allergy    Ambulatory  Referral to Otolaryngology         Josse Smith DO

## 2024-07-10 PROBLEM — H65.02 NON-RECURRENT ACUTE SEROUS OTITIS MEDIA OF LEFT EAR: Status: RESOLVED | Noted: 2024-06-10 | Resolved: 2024-07-10

## 2024-08-26 ENCOUNTER — OFFICE VISIT (OUTPATIENT)
Dept: URGENT CARE | Age: 19
End: 2024-08-26
Payer: COMMERCIAL

## 2024-08-26 VITALS
SYSTOLIC BLOOD PRESSURE: 137 MMHG | WEIGHT: 188 LBS | HEART RATE: 78 BPM | OXYGEN SATURATION: 96 % | TEMPERATURE: 97.7 F | RESPIRATION RATE: 18 BRPM | BODY MASS INDEX: 29.89 KG/M2 | DIASTOLIC BLOOD PRESSURE: 66 MMHG

## 2024-08-26 DIAGNOSIS — J02.9 SORE THROAT: ICD-10-CM

## 2024-08-26 DIAGNOSIS — B34.9 VIRAL ILLNESS: Primary | ICD-10-CM

## 2024-08-26 LAB — S PYO AG THROAT QL: NEGATIVE

## 2024-08-26 PROCEDURE — 87147 CULTURE TYPE IMMUNOLOGIC: CPT | Performed by: PHYSICIAN ASSISTANT

## 2024-08-26 PROCEDURE — 99213 OFFICE O/P EST LOW 20 MIN: CPT | Performed by: FAMILY MEDICINE

## 2024-08-26 PROCEDURE — 87070 CULTURE OTHR SPECIMN AEROBIC: CPT | Performed by: PHYSICIAN ASSISTANT

## 2024-08-26 NOTE — PROGRESS NOTES
[facility]      NAME: Milvia Colvin is a 18 y.o. female  : 2005    MRN: [id number]  DATE: 2024  TIME: 3:15 PM    Assessment and Plan   Viral illness [B34.9]  1. Viral illness        2. Sore throat  POCT rapid ANTIGEN strepA    Throat culture          Patient Instructions   Rapid strep test completed and negative. Will send for culture. Please check mychart for results.Infection appears viral.  Recommend symptomatic treatment.  Can take ibuprofen or tylenol as needed for pain or fever.  Over the counter cough and cold medications to help with symptoms.  Use salt water gargles for sore throat and throat lozenges.  Cough drops as needed.  Wash hands frequently to prevent the spread of infection.  Symptoms may persist for 10-14 days.  Risks and benefits discussed. Patient understands and agrees with the plan.      If tests have been performed at [facility], our office will contact you with results if changes need to be made to the care plan discussed with you at the visit.  You can review your full results on [facility]'s MyChart.     Follow up with PCP in 3-5 days.      If any of the following occur, please report to your nearest ED for evaluation or call 911.   Difficultly breathing or shortness of breath  Chest pain  Acutely worsening symptoms.   To present to the ER if symptoms worsen.  Chief Complaint     Chief Complaint   Patient presents with   • Cold Like Symptoms     Started Sat saúl with fatigue then Sun with fevers (100.1 -100.4), chills, sweats, sore throat, enlarged lymph nodes and body aches.  Covid test negative.           History of Present Illness   Milvia Colvin presents to the clinic with mother c/o    - at home covid test     Generalized Body Aches  Episode onset: . The problem occurs constantly. The problem is unchanged. The pain is moderate. Associated symptoms include congestion, a sore throat, swollen glands, fatigue, a fever, coughing and muscle aches. Pertinent  negatives include no ear discharge, ear pain, eye discharge, eye itching, eye pain, eye redness, headaches, photophobia, chest pain, shortness of breath, wheezing, abdominal pain, diarrhea, vomiting or rash. Past treatments include acetaminophen and one or more OTC medications. The treatment provided mild relief. The fever has been present for 1 to 2 days. The maximum temperature noted was less than 100.4 F.       Review of Systems   Review of Systems   Constitutional:  Positive for fatigue and fever. Negative for chills and diaphoresis.   HENT:  Positive for congestion and sore throat. Negative for ear discharge, ear pain and facial swelling.    Eyes:  Negative for photophobia, pain, discharge, redness, itching and visual disturbance.   Respiratory:  Positive for cough. Negative for apnea, chest tightness, shortness of breath and wheezing.    Cardiovascular:  Negative for chest pain and palpitations.   Gastrointestinal:  Negative for abdominal pain, diarrhea and vomiting.   Skin:  Negative for color change, rash and wound.   Neurological:  Negative for dizziness and headaches.   Hematological:  Negative for adenopathy.         Current Medications     Long-Term Medications   Medication Sig Dispense Refill   • Ciclopirox 1 % shampoo Apply 1 Application topically 2 (two) times a week 120 mL 0   • escitalopram (LEXAPRO) 10 mg tablet Take 1 tablet (10 mg total) by mouth daily 90 tablet 1   • ketoconazole (NIZORAL) 2 % shampoo      • clindamycin (CLINDAGEL) 1 % gel Apply topically 2 (two) times a day Apply to affected areas around the mouth. 30 g 0   • fluocinonide (LIDEX) 0.05 % external solution Apply topically 3 (three) times a day Apply to scalp. (Patient not taking: Reported on 5/29/2024) 60 mL 1   • fluticasone (FLONASE) 50 mcg/act nasal spray 2 sprays into each nostril daily (Patient not taking: Reported on 8/26/2024) 16 g 1   • nystatin-triamcinolone (MYCOLOG-II) ointment Apply topically 2 (two) times a day  (Patient not taking: Reported on 5/29/2024) 30 g 0   • ondansetron (ZOFRAN) 4 mg tablet Take 1 tablet (4 mg total) by mouth every 8 (eight) hours as needed for nausea or vomiting (Patient not taking: Reported on 8/26/2024) 10 tablet 0   • SUMAtriptan (Imitrex) 50 mg tablet Take 1 tablet (50 mg total) by mouth 2 (two) times a day as needed for migraine for up to 9 doses (Patient not taking: Reported on 8/26/2024) 9 tablet 0       Current Allergies     Allergies as of 08/26/2024 - Reviewed 08/26/2024   Allergen Reaction Noted   • Augmentin [amoxicillin-pot clavulanate] Rash 05/29/2024            The following portions of the patient's history were reviewed and updated as appropriate: allergies, current medications, past family history, past medical history, past social history, past surgical history and problem list.  Past Medical History:   Diagnosis Date   • Acute low back pain without sciatica 06/19/2023   • Concussion with no loss of consciousness 10/08/2019   • Migraine    • Obstructive sleep apnea, pediatric    • Perioral dermatitis 05/23/2023   • Right ankle sprain 01/08/2018     Past Surgical History:   Procedure Laterality Date   • TONSILLECTOMY AND ADENOIDECTOMY  08/2008    resolved: 8/2008   • WISDOM TOOTH EXTRACTION  08/07/2020     Social History     Socioeconomic History   • Marital status: Single     Spouse name: Not on file   • Number of children: Not on file   • Years of education: Not on file   • Highest education level: Not on file   Occupational History   • Not on file   Tobacco Use   • Smoking status: Never   • Smokeless tobacco: Never   Vaping Use   • Vaping status: Never Used   Substance and Sexual Activity   • Alcohol use: No   • Drug use: Never   • Sexual activity: Yes     Partners: Male     Birth control/protection: Condom Male, I.U.D.   Other Topics Concern   • Not on file   Social History Narrative    Caffeine use- one cup daily a few times a week, coffee or cola     Social Determinants of  Health     Financial Resource Strain: Not on file   Food Insecurity: Not on file   Transportation Needs: Not on file   Physical Activity: Not on file   Stress: Not on file   Social Connections: Not on file   Intimate Partner Violence: Not on file   Housing Stability: Not on file       Objective   /66   Pulse 78   Temp 97.7 °F (36.5 °C) (Tympanic)   Resp 18   Wt 85.3 kg (188 lb)   SpO2 96%   BMI 29.89 kg/m²      Physical Exam     Physical Exam  Vitals and nursing note reviewed.   Constitutional:       General: She is not in acute distress.     Appearance: She is well-developed. She is not diaphoretic.   HENT:      Head: Normocephalic and atraumatic.      Right Ear: Tympanic membrane and external ear normal.      Left Ear: Tympanic membrane and external ear normal.      Nose: Nose normal.      Mouth/Throat:      Mouth: Mucous membranes are moist.      Pharynx: Posterior oropharyngeal erythema present. No oropharyngeal exudate.   Eyes:      General: No scleral icterus.        Right eye: No discharge.         Left eye: No discharge.      Conjunctiva/sclera: Conjunctivae normal.   Cardiovascular:      Rate and Rhythm: Normal rate and regular rhythm.      Heart sounds: Normal heart sounds. No murmur heard.     No friction rub. No gallop.   Pulmonary:      Effort: Pulmonary effort is normal. No respiratory distress.      Breath sounds: Normal breath sounds. No decreased breath sounds, wheezing, rhonchi or rales.   Skin:     General: Skin is warm and dry.      Coloration: Skin is not pale.      Findings: No erythema or rash.   Neurological:      Mental Status: She is alert and oriented to person, place, and time.   Psychiatric:         Behavior: Behavior normal.         Thought Content: Thought content normal.         Judgment: Judgment normal.       Nkechi Chacon PA-C

## 2024-08-27 DIAGNOSIS — F32.81 PMDD (PREMENSTRUAL DYSPHORIC DISORDER): ICD-10-CM

## 2024-08-27 DIAGNOSIS — L21.9 SEBORRHEIC DERMATITIS: ICD-10-CM

## 2024-08-28 LAB — BACTERIA THROAT CULT: NORMAL

## 2024-08-28 RX ORDER — CICLOPIROX 1 G/100ML
1 SHAMPOO TOPICAL 2 TIMES WEEKLY
Qty: 120 ML | Refills: 0 | Status: SHIPPED | OUTPATIENT
Start: 2024-08-29

## 2024-08-28 RX ORDER — KETOCONAZOLE 20 MG/ML
SHAMPOO TOPICAL
Refills: 0 | OUTPATIENT
Start: 2024-08-28

## 2024-08-28 RX ORDER — ESCITALOPRAM OXALATE 10 MG/1
10 TABLET ORAL DAILY
Qty: 90 TABLET | Refills: 1 | Status: SHIPPED | OUTPATIENT
Start: 2024-08-28

## 2024-08-30 ENCOUNTER — TELEPHONE (OUTPATIENT)
Dept: URGENT CARE | Age: 19
End: 2024-08-30

## 2024-08-30 DIAGNOSIS — J02.0 STREP THROAT: Primary | ICD-10-CM

## 2024-08-30 DIAGNOSIS — B37.9 CANDIDIASIS: Primary | ICD-10-CM

## 2024-08-30 RX ORDER — AZITHROMYCIN 250 MG/1
TABLET, FILM COATED ORAL
Qty: 6 TABLET | Refills: 0 | Status: SHIPPED | OUTPATIENT
Start: 2024-08-30 | End: 2024-09-03

## 2024-08-30 RX ORDER — FLUCONAZOLE 150 MG/1
150 TABLET ORAL
Qty: 2 TABLET | Refills: 0 | Status: SHIPPED | OUTPATIENT
Start: 2024-08-30 | End: 2024-09-03

## 2024-08-30 NOTE — TELEPHONE ENCOUNTER
Patient aware that her strep was positive but not for group A She states her throat is not any better. She is aware that I am sending a prescription for azithromax to the pharmacy

## 2024-09-11 ENCOUNTER — OFFICE VISIT (OUTPATIENT)
Dept: FAMILY MEDICINE CLINIC | Facility: CLINIC | Age: 19
End: 2024-09-11
Payer: COMMERCIAL

## 2024-09-11 VITALS
TEMPERATURE: 97.6 F | SYSTOLIC BLOOD PRESSURE: 110 MMHG | HEIGHT: 65 IN | HEART RATE: 82 BPM | OXYGEN SATURATION: 99 % | DIASTOLIC BLOOD PRESSURE: 70 MMHG | WEIGHT: 197 LBS | BODY MASS INDEX: 32.82 KG/M2

## 2024-09-11 DIAGNOSIS — E55.9 VITAMIN D INSUFFICIENCY: ICD-10-CM

## 2024-09-11 DIAGNOSIS — Z23 NEED FOR COVID-19 VACCINE: ICD-10-CM

## 2024-09-11 DIAGNOSIS — Z23 ENCOUNTER FOR IMMUNIZATION: ICD-10-CM

## 2024-09-11 DIAGNOSIS — Z13.1 DIABETES MELLITUS SCREENING: ICD-10-CM

## 2024-09-11 DIAGNOSIS — F32.81 PMDD (PREMENSTRUAL DYSPHORIC DISORDER): ICD-10-CM

## 2024-09-11 DIAGNOSIS — E66.9 CLASS 1 OBESITY WITH BODY MASS INDEX (BMI) OF 32.0 TO 32.9 IN ADULT, UNSPECIFIED OBESITY TYPE, UNSPECIFIED WHETHER SERIOUS COMORBIDITY PRESENT: ICD-10-CM

## 2024-09-11 DIAGNOSIS — Z00.00 ANNUAL PHYSICAL EXAM: Primary | ICD-10-CM

## 2024-09-11 DIAGNOSIS — G43.909 MIGRAINE WITHOUT STATUS MIGRAINOSUS, NOT INTRACTABLE, UNSPECIFIED MIGRAINE TYPE: ICD-10-CM

## 2024-09-11 DIAGNOSIS — Z13.220 LIPID SCREENING: ICD-10-CM

## 2024-09-11 DIAGNOSIS — L21.9 SEBORRHEIC DERMATITIS: ICD-10-CM

## 2024-09-11 PROCEDURE — 90480 ADMN SARSCOV2 VAC 1/ONLY CMP: CPT

## 2024-09-11 PROCEDURE — 90471 IMMUNIZATION ADMIN: CPT

## 2024-09-11 PROCEDURE — 99395 PREV VISIT EST AGE 18-39: CPT | Performed by: PHYSICIAN ASSISTANT

## 2024-09-11 PROCEDURE — 91320 SARSCV2 VAC 30MCG TRS-SUC IM: CPT

## 2024-09-11 PROCEDURE — 90656 IIV3 VACC NO PRSV 0.5 ML IM: CPT

## 2024-09-11 RX ORDER — ROFLUMILAST 3 MG/G
AEROSOL, FOAM TOPICAL DAILY
COMMUNITY

## 2024-09-11 NOTE — PATIENT INSTRUCTIONS
"Patient Education     Routine physical for adults   The Basics   Written by the doctors and editors at [facility]   What is a physical? -- A physical is a routine visit, or \"check-up,\" with your doctor. You might also hear it called a \"wellness visit\" or \"preventive visit.\"  During each visit, the doctor will:   Ask about your physical and mental health   Ask about your habits, behaviors, and lifestyle   Do an exam   Give you vaccines if needed   Talk to you about any medicines you take   Give advice about your health   Answer your questions  Getting regular check-ups is an important part of taking care of your health. It can help your doctor find and treat any problems you have. But it's also important for preventing health problems.  A routine physical is different from a \"sick visit.\" A sick visit is when you see a doctor because of a health concern or problem. Since physicals are scheduled ahead of time, you can think about what you want to ask the doctor.  How often should I get a physical? -- It depends on your age and health. In general, for people age 21 years and older:   If you are younger than 50 years, you might be able to get a physical every 3 years.   If you are 50 years or older, your doctor might recommend a physical every year.  If you have an ongoing health condition, like diabetes or high blood pressure, your doctor will probably want to see you more often.  What happens during a physical? -- In general, each visit will include:   Physical exam - The doctor or nurse will check your height, weight, heart rate, and blood pressure. They will also look at your eyes and ears. They will ask about how you are feeling and whether you have any symptoms that bother you.   Medicines - It's a good idea to bring a list of all the medicines you take to each doctor visit. Your doctor will talk to you about your medicines and answer any questions. Tell them if you are having any side effects that bother you. You " "should also tell them if you are having trouble paying for any of your medicines.   Habits and behaviors - This includes:   Your diet   Your exercise habits   Whether you smoke, drink alcohol, or use drugs   Whether you are sexually active   Whether you feel safe at home  Your doctor will talk to you about things you can do to improve your health and lower your risk of health problems. They will also offer help and support. For example, if you want to quit smoking, they can give you advice and might prescribe medicines. If you want to improve your diet or get more physical activity, they can help you with this, too.   Lab tests, if needed - The tests you get will depend on your age and situation. For example, your doctor might want to check your:   Cholesterol   Blood sugar   Iron level   Vaccines - The recommended vaccines will depend on your age, health, and what vaccines you already had. Vaccines are very important because they can prevent certain serious or deadly infections.   Discussion of screening - \"Screening\" means checking for diseases or other health problems before they cause symptoms. Your doctor can recommend screening based on your age, risk, and preferences. This might include tests to check for:   Cancer, such as breast, prostate, cervical, ovarian, colorectal, prostate, lung, or skin cancer   Sexually transmitted infections, such as chlamydia and gonorrhea   Mental health conditions like depression and anxiety  Your doctor will talk to you about the different types of screening tests. They can help you decide which screenings to have. They can also explain what the results might mean.   Answering questions - The physical is a good time to ask the doctor or nurse questions about your health. If needed, they can refer you to other doctors or specialists, too.  Adults older than 65 years often need other care, too. As you get older, your doctor will talk to you about:   How to prevent falling at " home   Hearing or vision tests   Memory testing   How to take your medicines safely   Making sure that you have the help and support you need at home  All topics are updated as new evidence becomes available and our peer review process is complete.  This topic retrieved from [employer] on: May 02, 2024.  Topic 467090 Version 1.0  Release: 32.4.3 - C32.122  © 2024 UpToDate, Inc. and/or its affiliates. All rights reserved.  Consumer Information Use and Disclaimer   Disclaimer: This generalized information is a limited summary of diagnosis, treatment, and/or medication information. It is not meant to be comprehensive and should be used as a tool to help the user understand and/or assess potential diagnostic and treatment options. It does NOT include all information about conditions, treatments, medications, side effects, or risks that may apply to a specific patient. It is not intended to be medical advice or a substitute for the medical advice, diagnosis, or treatment of a health care provider based on the health care provider's examination and assessment of a patient's specific and unique circumstances. Patients must speak with a health care provider for complete information about their health, medical questions, and treatment options, including any risks or benefits regarding use of medications. This information does not endorse any treatments or medications as safe, effective, or approved for treating a specific patient. UpToDate, Inc. and its affiliates disclaim any warranty or liability relating to this information or the use thereof.The use of this information is governed by the Terms of Use, available at https://www.woltersBeSmartuwer.com/en/know/clinical-effectiveness-terms. 2024© UpToDate, Inc. and its affiliates and/or licensors. All rights reserved.  Copyright   © 2024 UpToDate, Inc. and/or its affiliates. All rights reserved.

## 2024-09-11 NOTE — PROGRESS NOTES
Adult Annual Physical  Name: Milvia Colvin      : 2005      MRN: [id number]  Encounter Provider: Fabienne Diane PA-C  Encounter Date: 2024   Encounter department: [facility]    Assessment & Plan  Annual physical exam         Need for COVID-19 vaccine    Orders:    COVID-19 Pfizer mRNA vaccine 12 yr and older (Comirnaty pre-filled syringe)    Encounter for immunization    Orders:    influenza vaccine preservative-free 0.5 mL IM (Fluzone, Afluria, Fluarix, Flulaval)    Vitamin D insufficiency  Not supplementing. Check level.   Orders:    Vitamin D 25 hydroxy; Future    Class 1 obesity with body mass index (BMI) of 32.0 to 32.9 in adult, unspecified obesity type, unspecified whether serious comorbidity present  Increase fruits and veggies. Strive for regular exercise.   Orders:    Comprehensive metabolic panel; Future    Lipid Panel with Direct LDL reflex; Future    CBC and differential; Future    TSH, 3rd generation with Free T4 reflex; Future    Vitamin D 25 hydroxy; Future    Diabetes mellitus screening    Orders:    Comprehensive metabolic panel; Future    Lipid screening    Orders:    Lipid Panel with Direct LDL reflex; Future    PMDD (premenstrual dysphoric disorder)  Controlled. Continue Lexapro 10 mg daily.          Migraine without status migrainosus, not intractable, unspecified migraine type  Stable but concerned about possible flare as stress increases with restarting school. Continue Imitrex 50 mg PRN, Zofran PRN, and Excedrin PRN.          Seborrheic dermatitis  Currently on ciclopirox shampoo but not too helpful. Follow-up with Derm as scheduled.           Immunizations and preventive care screenings were discussed with patient today. Appropriate education was printed on patient's after visit summary.    Counseling:  Exercise: the importance of regular exercise/physical activity was discussed. Recommend exercise 3-5 times per week for at least 30 minutes.           History of Present Illness     Adult Annual Physical:  Patient presents for annual physical.     PMDD - on Lexapro 10 mg daily - controlled     Migraines - uses Imitrex 50 mg, Zofran, Excedrin - good recently but just restarted school at [employer] for [employer] = usually migraines are triggered by stress    Vitamin D insufficiency - no longer on 2000 units daily -level was 28.3 in 6/2023    Scalp has been unchanged so thinks that she needs the injectable - has upcoming Derm appt       .     Diet and Physical Activity:  - Diet/Nutrition: well balanced diet. low on fruits and veggies  - Exercise:. 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  - Hearing: normal hearing bilateral ears.  - Vision: no vision problems.  - Dental: regular dental visits.    /GYN Health:  - Follows with GYN: yes.   - Menopause: premenopausal.   - Contraception: IUD placement.      Advanced Care Planning:  - Has an advanced directive?: no    - Has a durable medical POA?: no    - ACP document given to patient?: yes      Review of Systems   Constitutional:  Negative for chills and fever.   HENT:  Negative for congestion, rhinorrhea and sore throat.    Eyes:  Negative for visual disturbance.   Respiratory:  Negative for cough, shortness of breath and wheezing.    Cardiovascular:  Negative for chest pain, palpitations and leg swelling.   Gastrointestinal:  Negative for abdominal pain, blood in stool, constipation, diarrhea, nausea and vomiting.   Genitourinary:  Negative for dysuria and frequency.   Musculoskeletal:  Negative for arthralgias and myalgias.   Skin:  Negative for rash.   Neurological:  Negative for dizziness, syncope and headaches.   Hematological:  Does not bruise/bleed easily.   Psychiatric/Behavioral:  Negative for dysphoric mood. The patient is not nervous/anxious.      Medical History Reviewed by provider this encounter:  Tobacco  Allergies  Meds  Surg Hx  Fam Hx   "Soc Hx        Objective     /70 (BP Location: Left arm, Cuff Size: Standard)   Pulse 82   Temp 97.6 °F (36.4 °C) (Tympanic)   Ht 5' 5\" (1.651 m)   Wt 89.4 kg (197 lb)   SpO2 99%   BMI 32.78 kg/m²     Physical Exam  Vitals reviewed.   Constitutional:       General: She is not in acute distress.     Appearance: Normal appearance. She is well-developed. She is obese. She is not ill-appearing.   HENT:      Head: Normocephalic and atraumatic.      Right Ear: External ear normal.      Left Ear: External ear normal.      Nose: Nose normal.      Mouth/Throat:      Pharynx: No oropharyngeal exudate.   Eyes:      Conjunctiva/sclera: Conjunctivae normal.      Pupils: Pupils are equal, round, and reactive to light.   Neck:      Thyroid: No thyromegaly.   Cardiovascular:      Rate and Rhythm: Normal rate and regular rhythm.      Heart sounds: Normal heart sounds. No murmur heard.  Pulmonary:      Effort: Pulmonary effort is normal.      Breath sounds: Normal breath sounds. No wheezing or rales.   Abdominal:      General: Bowel sounds are normal. There is no distension.      Palpations: Abdomen is soft. There is no mass.      Tenderness: There is no abdominal tenderness.   Musculoskeletal:      Cervical back: Normal range of motion and neck supple.   Lymphadenopathy:      Cervical: No cervical adenopathy.   Skin:     General: Skin is warm and dry.      Findings: No rash.   Neurological:      Mental Status: She is alert.      Sensory: No sensory deficit.      Comments: 5/5 strength in UE and LE   Psychiatric:         Behavior: Behavior normal.         "

## 2024-09-15 PROBLEM — E66.811 CLASS 1 OBESITY WITH BODY MASS INDEX (BMI) OF 32.0 TO 32.9 IN ADULT: Status: ACTIVE | Noted: 2024-09-15

## 2024-09-15 PROBLEM — E66.9 CLASS 1 OBESITY WITH BODY MASS INDEX (BMI) OF 32.0 TO 32.9 IN ADULT: Status: ACTIVE | Noted: 2024-09-15

## 2024-09-15 PROBLEM — E66.9 CLASS 1 OBESITY WITH BODY MASS INDEX (BMI) OF 32.0 TO 32.9 IN ADULT: Status: ACTIVE | Noted: 2023-05-23

## 2024-09-15 PROBLEM — E66.811 CLASS 1 OBESITY WITH BODY MASS INDEX (BMI) OF 32.0 TO 32.9 IN ADULT: Status: ACTIVE | Noted: 2023-05-23

## 2024-09-15 NOTE — ASSESSMENT & PLAN NOTE
Stable but concerned about possible flare as stress increases with restarting school. Continue Imitrex 50 mg PRN, Zofran PRN, and Excedrin PRN.

## 2024-09-15 NOTE — ASSESSMENT & PLAN NOTE
Increase fruits and veggies. Strive for regular exercise.   Orders:    Comprehensive metabolic panel; Future    Lipid Panel with Direct LDL reflex; Future    CBC and differential; Future    TSH, 3rd generation with Free T4 reflex; Future    Vitamin D 25 hydroxy; Future

## 2025-01-25 DIAGNOSIS — G43.909 ACUTE MIGRAINE: ICD-10-CM

## 2025-01-25 DIAGNOSIS — F32.81 PMDD (PREMENSTRUAL DYSPHORIC DISORDER): ICD-10-CM

## 2025-01-26 RX ORDER — ESCITALOPRAM OXALATE 10 MG/1
10 TABLET ORAL DAILY
Qty: 90 TABLET | Refills: 1 | Status: SHIPPED | OUTPATIENT
Start: 2025-01-26

## 2025-01-26 RX ORDER — ONDANSETRON 4 MG/1
4 TABLET, FILM COATED ORAL EVERY 8 HOURS PRN
Qty: 10 TABLET | Refills: 0 | Status: SHIPPED | OUTPATIENT
Start: 2025-01-26

## 2025-03-12 ENCOUNTER — APPOINTMENT (OUTPATIENT)
Dept: LAB | Facility: MEDICAL CENTER | Age: 20
End: 2025-03-12
Payer: COMMERCIAL

## 2025-03-12 ENCOUNTER — OFFICE VISIT (OUTPATIENT)
Dept: FAMILY MEDICINE CLINIC | Facility: CLINIC | Age: 20
End: 2025-03-12
Payer: COMMERCIAL

## 2025-03-12 VITALS
OXYGEN SATURATION: 98 % | DIASTOLIC BLOOD PRESSURE: 70 MMHG | HEIGHT: 65 IN | WEIGHT: 211.2 LBS | HEART RATE: 100 BPM | TEMPERATURE: 97.6 F | BODY MASS INDEX: 35.19 KG/M2 | SYSTOLIC BLOOD PRESSURE: 116 MMHG

## 2025-03-12 DIAGNOSIS — E55.9 VITAMIN D INSUFFICIENCY: ICD-10-CM

## 2025-03-12 DIAGNOSIS — Z13.1 DIABETES MELLITUS SCREENING: ICD-10-CM

## 2025-03-12 DIAGNOSIS — Z13.220 LIPID SCREENING: ICD-10-CM

## 2025-03-12 DIAGNOSIS — G43.909 MIGRAINE WITHOUT STATUS MIGRAINOSUS, NOT INTRACTABLE, UNSPECIFIED MIGRAINE TYPE: ICD-10-CM

## 2025-03-12 DIAGNOSIS — E66.812 CLASS 2 OBESITY WITH BODY MASS INDEX (BMI) OF 35.0 TO 35.9 IN ADULT, UNSPECIFIED OBESITY TYPE, UNSPECIFIED WHETHER SERIOUS COMORBIDITY PRESENT: ICD-10-CM

## 2025-03-12 DIAGNOSIS — F32.81 PMDD (PREMENSTRUAL DYSPHORIC DISORDER): Primary | ICD-10-CM

## 2025-03-12 DIAGNOSIS — E66.811 CLASS 1 OBESITY WITH BODY MASS INDEX (BMI) OF 32.0 TO 32.9 IN ADULT, UNSPECIFIED OBESITY TYPE, UNSPECIFIED WHETHER SERIOUS COMORBIDITY PRESENT: ICD-10-CM

## 2025-03-12 LAB
25(OH)D3 SERPL-MCNC: 23.3 NG/ML (ref 30–100)
ALBUMIN SERPL BCG-MCNC: 4.5 G/DL (ref 3.5–5)
ALP SERPL-CCNC: 74 U/L (ref 34–104)
ALT SERPL W P-5'-P-CCNC: 11 U/L (ref 7–52)
ANION GAP SERPL CALCULATED.3IONS-SCNC: 7 MMOL/L (ref 4–13)
AST SERPL W P-5'-P-CCNC: 16 U/L (ref 13–39)
BASOPHILS # BLD AUTO: 0.05 THOUSANDS/ÂΜL (ref 0–0.1)
BASOPHILS NFR BLD AUTO: 1 % (ref 0–1)
BILIRUB SERPL-MCNC: 0.39 MG/DL (ref 0.2–1)
BUN SERPL-MCNC: 9 MG/DL (ref 5–25)
CALCIUM SERPL-MCNC: 9.4 MG/DL (ref 8.4–10.2)
CHLORIDE SERPL-SCNC: 105 MMOL/L (ref 96–108)
CHOLEST SERPL-MCNC: 163 MG/DL (ref ?–200)
CO2 SERPL-SCNC: 28 MMOL/L (ref 21–32)
CREAT SERPL-MCNC: 0.75 MG/DL (ref 0.6–1.3)
EOSINOPHIL # BLD AUTO: 0.19 THOUSAND/ÂΜL (ref 0–0.61)
EOSINOPHIL NFR BLD AUTO: 3 % (ref 0–6)
ERYTHROCYTE [DISTWIDTH] IN BLOOD BY AUTOMATED COUNT: 11.6 % (ref 11.6–15.1)
GFR SERPL CREATININE-BSD FRML MDRD: 114 ML/MIN/1.73SQ M
GLUCOSE P FAST SERPL-MCNC: 92 MG/DL (ref 65–99)
HCT VFR BLD AUTO: 42.7 % (ref 34.8–46.1)
HDLC SERPL-MCNC: 45 MG/DL
HGB BLD-MCNC: 14 G/DL (ref 11.5–15.4)
IMM GRANULOCYTES # BLD AUTO: 0.03 THOUSAND/UL (ref 0–0.2)
IMM GRANULOCYTES NFR BLD AUTO: 0 % (ref 0–2)
LDLC SERPL CALC-MCNC: 102 MG/DL (ref 0–100)
LYMPHOCYTES # BLD AUTO: 2.14 THOUSANDS/ÂΜL (ref 0.6–4.47)
LYMPHOCYTES NFR BLD AUTO: 28 % (ref 14–44)
MCH RBC QN AUTO: 29.2 PG (ref 26.8–34.3)
MCHC RBC AUTO-ENTMCNC: 32.8 G/DL (ref 31.4–37.4)
MCV RBC AUTO: 89 FL (ref 82–98)
MONOCYTES # BLD AUTO: 0.5 THOUSAND/ÂΜL (ref 0.17–1.22)
MONOCYTES NFR BLD AUTO: 7 % (ref 4–12)
NEUTROPHILS # BLD AUTO: 4.69 THOUSANDS/ÂΜL (ref 1.85–7.62)
NEUTS SEG NFR BLD AUTO: 61 % (ref 43–75)
NRBC BLD AUTO-RTO: 0 /100 WBCS
PLATELET # BLD AUTO: 306 THOUSANDS/UL (ref 149–390)
PMV BLD AUTO: 10.2 FL (ref 8.9–12.7)
POTASSIUM SERPL-SCNC: 4.4 MMOL/L (ref 3.5–5.3)
PROT SERPL-MCNC: 6.9 G/DL (ref 6.4–8.4)
RBC # BLD AUTO: 4.8 MILLION/UL (ref 3.81–5.12)
SODIUM SERPL-SCNC: 140 MMOL/L (ref 135–147)
TRIGL SERPL-MCNC: 79 MG/DL (ref ?–150)
TSH SERPL DL<=0.05 MIU/L-ACNC: 3.9 UIU/ML (ref 0.45–4.5)
WBC # BLD AUTO: 7.6 THOUSAND/UL (ref 4.31–10.16)

## 2025-03-12 PROCEDURE — 99214 OFFICE O/P EST MOD 30 MIN: CPT | Performed by: PHYSICIAN ASSISTANT

## 2025-03-12 PROCEDURE — 85025 COMPLETE CBC W/AUTO DIFF WBC: CPT

## 2025-03-12 PROCEDURE — 82306 VITAMIN D 25 HYDROXY: CPT

## 2025-03-12 PROCEDURE — 36415 COLL VENOUS BLD VENIPUNCTURE: CPT

## 2025-03-12 PROCEDURE — 80061 LIPID PANEL: CPT

## 2025-03-12 PROCEDURE — 80053 COMPREHEN METABOLIC PANEL: CPT

## 2025-03-12 PROCEDURE — 84443 ASSAY THYROID STIM HORMONE: CPT

## 2025-03-12 NOTE — ASSESSMENT & PLAN NOTE
BMI Counseling: Body mass index is 35.15 kg/m². The BMI is above normal. Nutrition recommendations include decreasing overall calorie intake. Exercise recommendations include exercising 3-5 times per week.

## 2025-03-12 NOTE — ASSESSMENT & PLAN NOTE
Continue with Imitrex as needed. Encouraged to use this at the start of a migraine rather than on day 2. Continue Tylenol, Excedrin and Zofran as needed.

## 2025-03-12 NOTE — PROGRESS NOTES
"Name: Milvia Colvin      : 2005      MRN: [id number]  Encounter Provider: Fabienne Diane PA-C  Encounter Date: 3/12/2025   Encounter department: [facility]  :  Assessment & Plan  PMDD (premenstrual dysphoric disorder)  Controlled. Continue with Lexapro 10 mg daily.          Migraine without status migrainosus, not intractable, unspecified migraine type  Continue with Imitrex as needed. Encouraged to use this at the start of a migraine rather than on day 2. Continue Tylenol, Excedrin and Zofran as needed.        Class 2 obesity with body mass index (BMI) of 35.0 to 35.9 in adult, unspecified obesity type, unspecified whether serious comorbidity present  BMI Counseling: Body mass index is 35.15 kg/m². The BMI is above normal. Nutrition recommendations include decreasing overall calorie intake. Exercise recommendations include exercising 3-5 times per week.           Vitamin D insufficiency  Not currently supplementing. Patient will go for labs as previously ordered.                History of Present Illness   Patient presents today for a 6 month follow-up.     PMDD - on Lexapro 10 mg daily - controlled     Migraines - uses Imitrex 50 mg, Zofran, Excedrin or Tylenol - notes she gets about 1 migraine monthly but typically not in the summer     Vitamin D insufficiency - not on 2000 units daily - level was 28.3 in 2023           Review of Systems   Psychiatric/Behavioral:  Negative for dysphoric mood. The patient is nervous/anxious (occ).        Objective   /70 (BP Location: Left arm, Patient Position: Sitting, Cuff Size: Large)   Pulse 100   Temp 97.6 °F (36.4 °C)   Ht 5' 5\" (1.651 m)   Wt 95.8 kg (211 lb 3.2 oz)   SpO2 98%   BMI 35.15 kg/m²      Physical Exam  Vitals reviewed.   Constitutional:       General: She is not in acute distress.     Appearance: Normal appearance. She is well-developed. She is obese. She is not ill-appearing.   HENT:      Head: " Normocephalic and atraumatic.   Neck:      Thyroid: No thyromegaly.      Vascular: No carotid bruit.   Cardiovascular:      Rate and Rhythm: Normal rate and regular rhythm.      Pulses: Normal pulses.      Heart sounds: Normal heart sounds. No murmur heard.  Pulmonary:      Effort: Pulmonary effort is normal.      Breath sounds: Normal breath sounds. No wheezing, rhonchi or rales.   Musculoskeletal:      Cervical back: Neck supple.      Right lower leg: No edema.      Left lower leg: No edema.   Lymphadenopathy:      Cervical: No cervical adenopathy.   Skin:     General: Skin is warm and dry.   Neurological:      Mental Status: She is alert.   Psychiatric:         Mood and Affect: Mood normal.         Behavior: Behavior normal.         Thought Content: Thought content normal.         Judgment: Judgment normal.

## 2025-03-13 ENCOUNTER — RESULTS FOLLOW-UP (OUTPATIENT)
Dept: FAMILY MEDICINE CLINIC | Facility: CLINIC | Age: 20
End: 2025-03-13

## 2025-03-13 DIAGNOSIS — E55.9 VITAMIN D INSUFFICIENCY: Primary | ICD-10-CM

## 2025-03-13 RX ORDER — MULTIVIT-MIN/IRON/FOLIC ACID/K 18-600-40
2000 CAPSULE ORAL DAILY
Qty: 90 CAPSULE | Refills: 3 | Status: SHIPPED | OUTPATIENT
Start: 2025-03-13

## 2025-05-27 DIAGNOSIS — F32.81 PMDD (PREMENSTRUAL DYSPHORIC DISORDER): ICD-10-CM

## 2025-05-28 RX ORDER — ESCITALOPRAM OXALATE 10 MG/1
10 TABLET ORAL DAILY
Qty: 90 TABLET | Refills: 0 | Status: SHIPPED | OUTPATIENT
Start: 2025-05-28